# Patient Record
Sex: FEMALE | Race: WHITE | NOT HISPANIC OR LATINO | Employment: FULL TIME | URBAN - METROPOLITAN AREA
[De-identification: names, ages, dates, MRNs, and addresses within clinical notes are randomized per-mention and may not be internally consistent; named-entity substitution may affect disease eponyms.]

---

## 2017-12-14 ENCOUNTER — ALLSCRIPTS OFFICE VISIT (OUTPATIENT)
Dept: OTHER | Facility: OTHER | Age: 37
End: 2017-12-14

## 2017-12-14 ENCOUNTER — TRANSCRIBE ORDERS (OUTPATIENT)
Dept: ADMINISTRATIVE | Facility: HOSPITAL | Age: 37
End: 2017-12-14

## 2017-12-14 DIAGNOSIS — R93.0 FAMILIAL ENLARGEMENT OF THE SELLA TURCICA: Primary | ICD-10-CM

## 2017-12-14 DIAGNOSIS — R93.0 ABNORMAL FINDINGS ON DIAGNOSTIC IMAGING OF SKULL AND HEAD, NOT ELSEWHERE CLASSIFIED (CODE): ICD-10-CM

## 2017-12-16 NOTE — CONSULTS
Assessment  1  Abnormal MRI of head (793 0) (R93 0)   2  Visual field defects (368 40) (H53 40)   3  Essential hypertension (401 9) (I10)    Plan  Abnormal MRI of head    · (1) ANTICARDIOLIPIN ANTIBODY; Status:Active; Requested for:68Nka1286;    Perform:[facility]; Due:59Zbq7427; Ordered; For:Abnormal MRI of head; Ordered By:Salome Rodas;   · (1) BUN; Status:Active; Requested for:14Dec2017;    Perform:[facility]; Due:55Hkr4211; Ordered; For:Abnormal MRI of head; Ordered By:Homero Rodas;   · (1) CREATININE; Status:Active; Requested for:14Dec2017;    Perform:[facility]; Due:14Dec2018; Ordered; For:Abnormal MRI of head; Ordered By:Homero Rodas;   · (1) FACTOR V LEIDEN MUTATION DNA ANALYSIS; Status:Active; Requestedfor:14Dec2017;    Perform:[facility]; Due:14Dec2018; Ordered; For:Abnormal MRI of head; Ordered By:Homero Rodas;   · (1) LUPUS ANTICOAGULANT PROFILE; Status:Active; Requested for:14Dec2017;    Perform:[facility]; Due:14Dec2018; Ordered; For:Abnormal MRI of head; Ordered By:Homero Rodas;   · (1) LYME ANTIBODY PROFILE W/REFLEX TO WESTERN BLOT; Status:Active; Requestedfor:14Dec2017;    Perform:[facility]; Due:14Dec2018; Ordered; For:Abnormal MRI of head; Ordered By:Homero Rodas;   · (1) SED RATE; Status:Active; Requested for:14Dec2017;    Perform:[facility]; Due:50Pge8261; Ordered; For:Abnormal MRI of head; Ordered By:Homero Rodas;   · (1) Samul [place] ANTIBODIES; Status:Active; Requested for:14Dec2017;    Perform:[facility]; Due:76Uhu5143; Ordered; For:Abnormal MRI of head; Ordered By:Homero Rodas;   · [facility]) Protein S Panel; Status:Active; Requested for:51Bkx5880;    Perform:[facility]; Due:13Sgi4977; Ordered; For:Abnormal MRI of head; Ordered By:Homero Rodas;   · (Q) ANTITHROMBIN III PANEL; Status:Active; Requested for:14Dec2017;    Perform:[facility]; Due:70Bcz9230; Ordered; For:Abnormal MRI of head; Ordered By:Homero Rodas;   · (Q) PROTEIN C, ACTIVITY & ANTIGEN; Status:Active; Requested for:55Xbe8776;    Perform:[facility]; Due:39Bxc3300; Ordered; For:Abnormal MRI of head; Ordered By:Tyler Rodas;   · * MRI CERVICAL SPINE [address]; Status:Need Information - FinancialAuthorization; Requested for:[id number];    Perform:[facility]; Due:08Egq3266; Last Updated By:Madelaine Mitchell; 12/14/2017 3:41:02 PM;Ordered; For:Abnormal MRI of head; Ordered By:Tyler Rodas;   · * MRI THORACIC SPINE [address]; Status:Need Information - FinancialAuthorization; Requested for:[id number];    Perform:[facility]; Due:90Twe8736; Last Updated By:Madelaine Mitchell; 12/14/2017 3:41:02 PM;Ordered; For:Abnormal MRI of head; Ordered By:Tyler Rodas;   · *(Q) VITAMIN D, 25-HYDROXY, LC/MS/MS; Status:Active; Requested for:01Lji4538;    Perform:[facility]; Due:20Pde0567; Ordered; For:Abnormal MRI of head; Ordered By:Tyler Rodas;  Essential hypertension    · 1 - Ella Spear (Nephrology) Co-Management  *  Status: Active  Requested for:15Kyp9028   Ordered; For: Essential hypertension; Ordered By: Raman Smith Performed:  Due: 24PGK6045; Last Updated By: Delroy Pollack; 12/14/2017 3:41:02 PM  Care Summary provided  : Yes    Discussion/Summary  Discussion Summary:   Pt here for intial neuro assessmentpt with onset of right visual field disturbance in rub[place] novpt noted needing to move her head to get full picture on the right sidept denies diplopia , vertigo, ha , n or vpt with h/o sinus infectionspt notes no falls or tripsno change in bowel or bladderpt with known htn, initially diagnosed in her 20spt recommended to see nephrology for any causesalso rev mri head in detail with ptthere are multiple supra and infra tentorial lesions with varying degrees of enh and others non enhpt likely symptomatic from occipital lesion contributing to field abnon direct finger counting in office pt perfectpt is going for formal vf tomorrowpt was seen by optometry at onset    will obtain those recordspt needs mri c and t spine with and without cotnrast looking for any other areas of wm changespt denies any fever or chills or illness at time of presentationrev differential of ms, infectious and neoplasticless likely neoplastic and infectious given current clinical presentationwill check labs and hypercoag statuspt exam normalwould not give iv steroids at this time as pt has clinically improveddiscussed possiblity of LPwill get imaging of spine first as pt not overally excited about LPpt to call me about 5 days after labs and 3 days after mrisrtn in 6 weeks  Counseling Documentation With Imm: The patient was counseled regarding diagnostic results,-- instructions for management,-- risk factor reductions,-- prognosis,-- patient and family education,-- risks and benefits of treatment options  total time of encounter was 70 minutes-- and-- greater than 50% minutes was spent counseling  Chief Complaint  Chief Complaint Free Text Note Form: Patient is here for initial consult  History of Present Illness  HPI: pt is a 39 yo f with pmh of htn for many yrs  pt tried on mutliple meds  pt with issues with bp since her 25s  pt has not been seen by renal in the past  pt pt with onset of right visual field disturbance in rubin nov  pt noted needing to move her head to get full picture on the right side and to see her colleague on the right side was not as clear  pt recalls having to move her actual head to the right to see him better  pt denies diplopia , vertigo, ha , n or v  pt with h/o sinus infections and occ ha associated with her sinuses  nothing new or particular with the visual change  no fever or chills  no one else ill in the home  no change in speech or swallowing  no change in bowel or bladder  no loc  pt notes no falls or trips  pt was seen by optometry iniitally dr Mal Guerin [phone]  we do not have records at this time  per pt he thought she could not see as well to the right   pt also seen by her pcp  also rev mri head in detail with pt and spouse  there are multiple supra and infra tentorial lesions with varying degrees of enh and others non enh  lesions noted in the left cerebellar hemisphre measuring 9 mm with rim enh  multiple supratentorial lesions and periventricular in location  some of the pv lesions have a perpendicular orientation to the body of the LV  the largest is in the right temproal parietal area 11 mm with partial enh of the lesion  there is a 5mm enh lesion within the occipal horn of the left LV  these findings most likely represent MS  the supratentorial lesions are noted to have some of which demonstrate enh  MS considered most likely with other etiologies less likley  rev inflammatory, infectious and neoplastic with pt as well  pt had lyme testing done and told neg  no oct testing and no cord testing  rev the most likely dx of MS  rev course and pathophysiology of the disease and radiographical and clinical characteristics ie lesions  by time and space  pt likely symptomatic from occipital lesion contributing to field abn  on direct finger counting in office pt perfect  pt is going for formal vf tomorrow with dr Fritzi Buerger  pt had cancelled appt today with him since she was feeling well, we re ordered durng her appt and was able to get the 730 am appt back on schedule for her  pt was seen by optometry at onset    will obtain those records  no prior sxs  no prior unexplained neuro sxs  no utis  pt needs mri c and t spine with and without cotnrast looking for any other areas of wm changes  pt denies any fever or chills or illness at time of presentation  not clinically ill today  rev differential of ms, infectious and neoplasticless likely neoplastic and infectious given current clinical presentation   will check labs and hypercoag statuspt exam normal  at this time of about 6 weeks out from presentation, I would not give iv steroids at this time as pt has clinically improved  discussed possiblity of LP to help exclude infectious  will get imaging of spine first as pt not overally excited about LP  pt is not eager to do an lp and would like other testing first  no change in mental status  pt to call me about 5 days after labs and 3 days after mris  rtn in 6 weeks to discuss likely start of imd med  Review of Systems  Neurological ROS:  Constitutional: no fever, no chills, no recent weight gain, no recent weight loss, no complaints of feeling tired, no changes in appetite  HEENT:  no sinus problems, not feeling congested, no blurred vision, no dryness of the eyes, no eye pain, no hearing loss, no tinnitus, no mouth sores, no sore throat, no hoarseness, no dysphagia, no masses, no bleeding  Cardiovascular:  no chest pain or pressure, no palpitations present, the heart rate was not rapid or irregular, no swelling in the arms or legs, no poor circulation  Respiratory:  no unusual or persistant cough, no shortness of breath with or without exertion  Gastrointestinal:  no nausea, no vomiting, no diarrhea, no abdominal pain, no changes in bowel habits, no melena, no loss of bowel control  Genitourinary:  no incontinence, no feelings of urinary urgency, no increase in frequency, no urinary hesitancy, no dysuria, no hematuria  Musculoskeletal:  no arthralgias, no myalgias, no immobility or loss of function, no head/neck/back pain, no pain while walking  Integumentary  no masses, no rash, no skin lesions, no livedo reticularis  Psychiatric:  no anxiety, no depression, no mood swings, no psychiatric hospitalizations, no sleep problems  Endocrine  no unusual weight loss or gain, no excessive urination, no excessive thirst, no hair loss or gain, no hot or cold intolerance, no menstrual period change or irregularity, no loss of sexual ability or drive, no erection difficulty, no nipple discharge    Hematologic/Lymphatic:  no unusual bleeding, no tendency for easy bruising, no clotting skin or lumps  Neurological General:  no headache, no nausea or vomiting, no lightheadedness, no convulsions, no blackouts, no syncope, no trauma, no photopsia, no increased sleepiness, no trouble falling asleep, no snoring, no awakening at night  Neurological Mental Status:  no confusion, no mood swings, no alteration or loss of consciousness, no difficulty expressing/understanding speech, no memory problems  Neurological Cranial Nerves: blurry or double vision  Neurological Motor findings include:  no tremor, no twitching, no cramping(pre/post exercise), no atrophy  Neurological Coordination:  no unsteadiness, no vertigo or dizziness, no clumsiness, no problems reaching for objects  Neurological Sensory:  no numbness, no pain, no tingling, does not fall when eyes closed or taking a shower  Neurological Gait:  no difficulty walking, not falling to one side, no sensation of being pushed, has not had falls  Active Problems  1  Acute atopic conjunctivitis, unspecified laterality (372 05) (H10 10)   2  Acute conjunctivitis (372 00) (H10 30)   3  Acute pharyngitis (462) (J02 9)   4  Acute sinusitis (461 9) (J01 90)   5  Allergic rhinitis (477 9) (J30 9)   6  Cough (786 2) (R05)   7  Dermatitis (692 9) (L30 9)   8  Dysmenorrhea (625 3) (N94 6)   9  Encounter for routine gynecological examination (V72 31) (Z01 419)   10  Essential hypertension (401 9) (I10)   11  Morbid or severe obesity due to excess calories (278 01) (E66 01)    Past Medical History  1  History of Acute tonsillitis (463) (J03 90)   2  History of Encounter for routine gynecological examination (V72 31) (Z01 419)   3  History of Mastodynia (611 71) (N64 4)   4  History of Morbid or severe obesity due to excess calories (278 01) (E66 01)   5  History of Vitamin D deficiency (268 9) (E55 9)  Active Problems And Past Medical History Reviewed: The active problems and past medical history were reviewed and updated today  Surgical History  Surgical History Reviewed: The surgical history was reviewed and updated today  Family History  Mother    1  Family history of Family Health Status Of Mother - Good  Father    2  Family history of Asthma (V17 5)   3  Family history of Heart Disease (V17 49)  Family History Reviewed: The family history was reviewed and updated today  Social History   · Denied: History of Alcohol Use (History)   · Denied: History of Drug Use   · Never A Smoker   · Denied: History of Tobacco Use  Social History Reviewed: The social history was reviewed and updated today  Current Meds   1  Amoxicillin 875 MG Oral Tablet; TAKE 1 TABLET EVERY 12 HOURS DAILY; Therapy: 26NQS3272 to (Evaluate:19Jma6275)  Requested for: 01WZE5069; Last Rx:29Iie9787 Ordered   2  Amoxicillin-Pot Clavulanate 875-125 MG Oral Tablet; Take one tablet twice daily for 7 days; Therapy: 41CBU5823 to (Last Azzie Epp)  Requested for: 48TUY5300 Ordered   3  Mucinex 600 MG Oral Tablet Extended Release 12 Hour; as directed; Therapy: 18UDN1882 to (Last Rx:28Pxe4235) Ordered  Medication List Reviewed: The medication list was reviewed and updated today  Allergies  1  No Known Drug Allergies    Vitals  Signs   Recorded: 97Rff6164 02:21PM   Heart Rate: 99  Systolic: 079  Diastolic: 73  Height: 5 ft 4 in  Weight: 250 lb   BMI Calculated: 42 91  BSA Calculated: 2 15  O2 Saturation: 96    Physical Exam   Constitutional  General appearance: No acute distress, well appearing and well nourished  -- pos distal pulses abhijit  Eyes  Ophthalmoscopic examination: Vision is grossly normal  Gross visual field testing by confrontation shows no abnormalities  EOMI in both eyes  Conjunctivae clear  Eyelids normal palpebral fissures equal  Orbits exhibit normal position  No discharge from the eyes  PERRL  Musculoskeletal  Gait and station: Normal gait, stance and balance  Muscle strength: Normal strength throughout     Muscle tone: No atrophy, abnormal movements, flaccidity, cogwheeling or spasticity  Neurologic  Orientation to person, place, and time: Normal    Recent and remote memory: Demonstrates normal memory  Attention span and concentration: Normal thought process and attention span  Language: Names objects, able to repeat phrases and speaks spontaneously  Fund of knowledge: Normal vocabulary with appropriate knowledge of current events and past history  2nd cranial nerve: Normal  -- disc flat   3rd, 4th, and 6th cranial nerves: Normal    5th cranial nerve: Normal    7th cranial nerve: Normal    8th cranial nerve: Normal    9th cranial nerve: Normal    11th cranial nerve: Normal    12th cranial nerve: Normal    Sensation: Normal    Reflexes: Normal    Coordination: Normal        Results/Data  Diagnostic Studies Reviewed: I personally reviewed the films/images/results in the office today  My interpretation follows  Diagnostic Review see hpi  Future Appointments    Date/Time Provider Specialty Site   01/25/2018 10:00 AM EFE Eden   Neurology [facility]     Signatures   Electronically signed by : EFE Flaherty ; Dec 15 2017  6:18AM EST                       (Author)

## 2017-12-31 ENCOUNTER — HOSPITAL ENCOUNTER (OUTPATIENT)
Dept: RADIOLOGY | Facility: HOSPITAL | Age: 37
Discharge: HOME | End: 2017-12-31
Attending: PSYCHIATRY & NEUROLOGY
Payer: COMMERCIAL

## 2017-12-31 DIAGNOSIS — R93.0 ABNORMAL FINDINGS ON DIAGNOSTIC IMAGING OF SKULL AND HEAD, NOT ELSEWHERE CLASSIFIED (CODE): ICD-10-CM

## 2017-12-31 PROCEDURE — 72156 MRI NECK SPINE W/O & W/DYE: CPT

## 2017-12-31 PROCEDURE — 72157 MRI CHEST SPINE W/O & W/DYE: CPT

## 2017-12-31 PROCEDURE — A9585 GADOBUTROL INJECTION: HCPCS | Performed by: PSYCHIATRY & NEUROLOGY

## 2017-12-31 RX ADMIN — GADOBUTROL 15 ML: 604.72 INJECTION INTRAVENOUS at 14:48

## 2018-01-17 NOTE — PROGRESS NOTES
Assessment    1  Acute sinusitis (461 9) (J01 90)    Plan  Acute sinusitis    · Amoxicillin-Pot Clavulanate 875-125 MG Oral Tablet; Take one tablet twice daily for  7 days   · Mucinex 600 MG Oral Tablet Extended Release 12 Hour; as directed   · Apply warm moist compresses to the affected area 3 times a day for 5 minutes ;  Status:Complete;   Done: 80NCR0496 02:35PM   · Drink at least 6 glasses of water or juice a day ; Status:Complete;   Done: 72SHU0337  02:35PM   · Irrigate your nose twice a day ; Status:Complete;   Done: 14FLU2041 02:35PM   · Taking a hot steamy shower may help your condition ; Status:Complete;   Done:  83UUO8624 02:35PM    Discussion/Summary  Possible side effects of new medications were reviewed with the patient/guardian today  The treatment plan was reviewed with the patient/guardian  The patient/guardian understands and agrees with the treatment plan     Follow Up with your Primary Care Provider in 4-5 days  If your symptoms worsen follow up at the nearest [place] [address] Emergency Room  Chief Complaint    1  Cold Symptoms    History of Present Illness    Lowell Fairchild presents with complaints of gradual onset of constant episodes of moderate cold symptoms  Episodes started about 6 days ago  Associated symptoms include nasal congestion, runny nose, post nasal drainage, sore throat, productive cough, facial pressure, headache, fatigue, weakness and chills, but no fever  Review of Systems    Constitutional: as noted in HPI    ENT: as noted in HPI  Cardiovascular: no complaints of slow or fast heart rate, no chest pain, no palpitations, no leg claudication or lower extremity edema  Respiratory: no complaints of shortness of breath, no wheezing, no dyspnea on exertion, no orthopnea or PND  Active Problems    1  Acute atopic conjunctivitis, unspecified laterality (372 05) (H10 10)   2  Acute conjunctivitis (372 00) (H10 30)   3  Acute pharyngitis (462) (J02 9)   4   Acute sinusitis (461 9) (J01 90)   5  Allergic rhinitis (477 9) (J30 9)   6  Cough (786 2) (R05)   7  Dermatitis (692 9) (L30 9)   8  Dysmenorrhea (625 3) (N94 6)   9  Encounter for routine gynecological examination (V72 31) (Z01 419)   10  Essential hypertension (401 9) (I10)   11  Morbid or severe obesity due to excess calories (278 01) (E66 01)    Past Medical History    1  History of Acute tonsillitis (463) (J03 90)   2  History of Encounter for routine gynecological examination (V72 31) (Z01 419)   3  History of Mastodynia (611 71) (N64 4)   4  History of Morbid or severe obesity due to excess calories (278 01) (E66 01)   5  History of Vitamin D deficiency (268 9) (E55 9)    The active problems and past medical history were reviewed and updated today  Family History  Mother    1  Family history of Family Health Status Of Mother - Good  Father    2  Family history of Asthma (V17 5)   3  Family history of Heart Disease (V17 49)    The family history was reviewed and updated today  Social History    · Denied: History of Alcohol Use (History)   · Denied: History of Drug Use   · Never A Smoker   · Denied: History of Tobacco Use  The social history was reviewed and updated today  Current Meds   1  Amoxicillin 875 MG Oral Tablet; TAKE 1 TABLET EVERY 12 HOURS DAILY; Therapy: 79LPR3851 to (Evaluate:71Don8432)  Requested for: 48HCI8878; Last   Rx:74Ibl8800 Ordered    The medication list was reviewed and updated today  Allergies    1  No Known Drug Allergies    Observations Made  Mildly ill appearing, appears fatigues  Nasal sounding voice  PTP of the maxillary sinuses BL      Message    Timoteo Mchugh is under my professional care   She was evaluated by myself via video conference on 9/25/16             Signatures   Electronically signed by : Sheryll Canavan, D O ; Sep 25 2016  2:35PM EST                       (Author)

## 2018-01-18 NOTE — MISCELLANEOUS
Message  Return to work or school evisit:   Dheeraj Mitchell is under my professional care   She was evaluated by myself via video conference on 9/25/16             Signatures   Electronically signed by : ORAL Jason ; Sep 25 2016  2:35PM EST                       (Author)

## 2018-01-23 VITALS
BODY MASS INDEX: 42.68 KG/M2 | HEIGHT: 64 IN | WEIGHT: 250 LBS | OXYGEN SATURATION: 96 % | SYSTOLIC BLOOD PRESSURE: 151 MMHG | HEART RATE: 99 BPM | DIASTOLIC BLOOD PRESSURE: 73 MMHG

## 2018-01-25 ENCOUNTER — OFFICE VISIT (OUTPATIENT)
Dept: NEUROLOGY | Facility: CLINIC | Age: 38
End: 2018-01-25
Payer: COMMERCIAL

## 2018-01-25 VITALS
BODY MASS INDEX: 50.02 KG/M2 | HEIGHT: 64 IN | RESPIRATION RATE: 16 BRPM | SYSTOLIC BLOOD PRESSURE: 162 MMHG | WEIGHT: 293 LBS | HEART RATE: 86 BPM | DIASTOLIC BLOOD PRESSURE: 76 MMHG

## 2018-01-25 DIAGNOSIS — R93.0 ABNORMAL MRI OF HEAD: Primary | ICD-10-CM

## 2018-01-25 DIAGNOSIS — H53.40 VISUAL FIELD DEFECTS: ICD-10-CM

## 2018-01-25 PROCEDURE — 99215 OFFICE O/P EST HI 40 MIN: CPT | Performed by: PSYCHIATRY & NEUROLOGY

## 2018-01-25 RX ORDER — FLUTICASONE PROPIONATE 50 MCG
SPRAY, SUSPENSION (ML) NASAL DAILY PRN
Refills: 0 | COMMUNITY
Start: 2017-10-25 | End: 2020-02-20

## 2018-01-25 RX ORDER — NAPROXEN SODIUM 550 MG/1
550 TABLET ORAL
COMMUNITY
Start: 2017-09-07 | End: 2021-06-29

## 2018-01-25 RX ORDER — SERTRALINE HYDROCHLORIDE 25 MG/1
TABLET, FILM COATED ORAL
Refills: 11 | COMMUNITY
Start: 2017-12-22 | End: 2019-01-25

## 2018-01-25 RX ORDER — CETIRIZINE HYDROCHLORIDE 10 MG/1
TABLET ORAL
COMMUNITY
Start: 2017-08-22 | End: 2019-10-09 | Stop reason: ALTCHOICE

## 2018-01-25 RX ORDER — AMLODIPINE BESYLATE 10 MG/1
TABLET ORAL
Refills: 5 | COMMUNITY
Start: 2018-01-07 | End: 2018-06-26

## 2018-01-25 RX ORDER — HYDROCHLOROTHIAZIDE 25 MG/1
TABLET ORAL
Refills: 11 | COMMUNITY
Start: 2018-01-07 | End: 2018-02-20 | Stop reason: SDUPTHER

## 2018-01-25 NOTE — PATIENT INSTRUCTIONS
Visual field defects  Pt here for neuro followup  Pt last seen in dec   Pt had updated mri c and t spine since our last visit looking for any other wm abn  Mri c and t spine rev at [facility] and no intramedullary lesions seen, no enh  Pt had updated labs as well  Neg cardiolipin ab, lyme neg in serum, sjogrens neg, factor v not detected  Pt did have elevated sed rate of 58 in dec and then repeated in [place] with level of 55  Pt had a fall just prior to the dec lab and also injection in her right wrist about 6 days prior to the jan draw  No fever or chills , no recent infections  Re rev abn mri head findings again with pt and spouse  Due to varying degrees of enh and old lesions both supra and infratentorially, highly suspicious of MS  Will proceed with LP due to presentation, elevated sed rate and multiplicity of lesions and enh  Pt is in agreement , rev procedure and risk for infection and post spinal ha  Staff will help with orders and coordination with IR   Rev with pt multiple IMD therapies as well as pros and cons of each  Will provide literature as well  Pt also needs jcv ab testing to be done at [facility]    Abnormal MRI of head  Pt had rest of cns work up completed with mri c and t spine as noted above  Negative mri c and t spine for any cord signal abn  Pt to retn after LP within about 2 weeks to review results and likely start med program  Pt to also call for any new sxs  Pt did follow up with dr Hasmukh Kimbrough, still need consultation note  Office will call for report  Per pt, pt back to baseline

## 2018-01-25 NOTE — ASSESSMENT & PLAN NOTE
Pt here for neuro followup  Pt last seen in dec   Pt had updated mri c and t spine since our last visit looking for any other wm abn  Mri c and t spine rev at appt and no intramedullary lesions seen, no enh  Pt had updated labs as well  Neg cardiolipin ab, lyme neg in serum, sjogrens neg, factor v not detected  Pt did have elevated sed rate of 58 in dec and then repeated in [place] with level of 55  Pt had a fall just prior to the dec lab and also injection in her right wrist about 6 days prior to the jan draw  No fever or chills , no recent infections  Re rev abn mri head findings again with pt and spouse  Due to varying degrees of enh and old lesions both supra and infratentorially, highly suspicious of MS  Will proceed with LP due to presentation, elevated sed rate and multiplicity of lesions and enh  Pt is in agreement , rev procedure and risk for infection and post spinal ha  Staff will help with orders and coordination with IR   Rev with pt multiple IMD therapies as well as pros and cons of each  Will provide literature as well  Pt also needs jcv ab testing to be done at [facility]

## 2018-01-25 NOTE — PROGRESS NOTES
Patient ID: Lamin Bunch is a 40 y o  female  Assessment/Plan:    Visual field defects  Pt here for neuro followup  Pt last seen in dec   Pt had updated mri c and t spine since our last visit looking for any other wm abn  Mri c and t spine rev at [facility] and no intramedullary lesions seen, no enh  Pt had updated labs as well  Neg cardiolipin ab, lyme neg in serum, sjogrens neg, factor v not detected  Pt did have elevated sed rate of 58 in dec and then repeated in [place] with level of 55  Pt had a fall just prior to the dec lab and also injection in her right wrist about 6 days prior to the jan draw  No fever or chills , no recent infections  Re rev abn mri head findings again with pt and spouse  Due to varying degrees of enh and old lesions both supra and infratentorially, highly suspicious of MS  Will proceed with LP due to presentation, elevated sed rate and multiplicity of lesions and enh  Pt is in agreement , rev procedure and risk for infection and post spinal ha  Staff will help with orders and coordination with IR   Rev with pt multiple IMD therapies as well as pros and cons of each  Will provide literature as well  Pt also needs jcv ab testing to be done at [facility]    Abnormal MRI of head  Pt had rest of cns work up completed with mri c and t spine as noted above  Negative mri c and t spine for any cord signal abn  Pt to retn after LP within about 2 weeks to review results and likely start med program  Pt to also call for any new sxs  Pt did follow up with dr Tracy Tsai, still need consultation note  Office will call for report  Per pt, pt back to baseline              Subjective:    HPI  HPI: pt is a 39 yo f with pmh of htn for many yrs  pt tried on mutliple meds  pt with issues with bp since her 25s   pt has not been seen by renal in the past  pt pt with onset of right visual field disturbance in [place] nov  pt noted needing to move her head to get full picture on the right side and to see her colleague on the right side was not as clear  pt recalls having to move her actual head to the right to see him better  pt denies diplopia , vertigo, ha , n or v  pt with h/o sinus infections and occ ha associated with her sinuses  nothing new or particular with the visual change  no fever or chills  no one else ill in the home  no change in speech or swallowing  no change in bowel or bladder  no loc  pt notes no falls or trips  pt was seen by optometry iniitally dr Yasmine Mattson [phone]  we were able to obtain optometry record and question of a left MR paralysis  Pt was referred to dr Johanna Stanton  pt also seen by her pcp  also  Re rev mri head in detail with pt and spouse again today at appt  there are multiple supra and infra tentorial lesions with varying degrees of enh and others non enh  lesions noted in the left cerebellar hemisphre measuring 9 mm with rim enh  multiple supratentorial lesions and periventricular in location  some of the pv lesions have a perpendicular orientation to the body of the LV  the largest is in the right temproal parietal area 11 mm with partial enh of the lesion  there is a 5mm enh lesion within the occipal horn of the left LV  Re rev wit hpt ant spouse that these findings most likely represent MS  the supratentorial lesions are noted to have some of which demonstrate enh  MS considered most likely with other etiologies less likley  rev inflammatory, infectious and neoplastic with pt as well  pt had lyme testing done and told neg  This was repeated since our last visit and also neg  Pt seen by optho at our encouragement as we had to re make appt for her  No records sent  we will call again for report  Per pt no optho concerns and pt was back to her baseline  Pt had updated mri c and t spine since our last visit looking for any other wm abn  Mri c and t spine rev at appt and no intramedullary lesions seen, no enh  Pt had updated labs as well    Neg cardiolipin ab, lyme neg in serum, sjogrens neg, factor v not detected  Pt did have elevated sed rate of 58 in dec and then repeated in [place] with level of 55  Pt had a fall just prior to the dec lab and also injection in her right wrist about 6 days prior to the jan draw  No fever or chills , no recent infections  Re rev abn mri head findings again with pt and spouse  Due to varying degrees of enh and old lesions both supra and infratentorially, highly suspicious of Ms  Will proceed with LP due to presentation, elevated sed rate and multiplicity of lesions and enh  Pt is in agreement , rev procedure and risk for infection and post spinal ha  Staff will help with orders and coordination with IR   Rev with pt multiple IMD therapies as well as pros and cons of each  Rev all meds and side effect profile  Also provided pt with copy of literature for about 6 of the medications     Pt also needs jcv ab testing to be done at [facility]   This testing will also help narrow down our options as well  Extended appt today due to review of presentation to current as well as all testing to date and rev of differential of all possible etiologies  rev the most likely dx of MS  rev course and pathophysiology of the disease and radiographical and clinical characteristics ie lesions  by time and space  pt likely symptomatic from occipital lesion contributing to field abn  on direct finger counting in office pt perfect  Need copyy of formal vf from dr Rose Pool  no prior unexplained neuro sxs  no utis  No change in speech or swallowing  No falls or trips  No recent infections  No sz  No head trauma  RESULTS      MRI THORACIC SPINE 12/31/17  1  No disc  2  No cord signal abnormality, abnormal enhancement or MR findings confirm demyelinating disease in the visualized spinal cord      MRI C SPINE 12/31/17   1  Minimal spondylosis  No cord compression or cord signal abnormality    2   No MR findings of demyelinating     No results found for this or any previous visit (from the past 2016 hour(s))  The following portions of the patient's history were reviewed and updated as appropriate: allergies, current medications, past family history, past medical history, past social history, past surgical history and problem list        Objective:    Blood pressure 162/76, pulse 86, resp  rate 16, height 5' 4" (1 626 m), weight (!) 155 kg (342 lb)  Physical Exam   Constitutional: She appears well-developed  Eyes: EOM are normal  Pupils are equal, round, and reactive to light  Neck: Normal range of motion  Cardiovascular: Intact distal pulses  Neurological: She has normal strength and normal reflexes  Gait normal    Psychiatric: Her speech is normal        Neurological Exam    Mental Status  The patient is alert and oriented to person, place, time, and situation  Her recent and remote memory are normal  Her speech is normal  Her language is fluent with no aphasia  She has normal attention span and concentration  She follows multi-step commands  She has a normal fund of knowledge  Cranial Nerves    CN II: The patient's visual acuity and visual fields are normal  Funduscopic exam performed  Right: The right disc is normal   Left: The left disc is normal   CN III, IV, VI: The patient's pupils are equally round and reactive to light and ocular movements are normal   CN V: The patient has normal facial sensation  CN VII:  The patient has symmetric facial movement  CN VIII:  The patient's hearing is normal   CN IX, X: The patient has symmetric palate movement and normal gag reflex  CN XI: The patient's shoulder shrug strength is normal   CN XII: The patient's tongue is midline without atrophy or fasciculations  Motor  The patient has normal muscle bulk throughout  Her overall muscle tone is normal throughout  Her strength is 5/5 throughout all four extremities        Sensory  The patient's sensation is normal in all four extremities to light touch, pinprick, temperature, vibration and proprioception  Reflexes  Deep tendon reflexes are 2+ and symmetric in all four extremities with downgoing toes bilaterally  Gait and Coordination  The patient has normal gait and station  She has normal right heel to shin and normal left heel to shin coordination  She has normal right finger to nose and normal left finger to nose coordination  ROS:    Review of Systems   Constitutional: Negative  Negative for appetite change and fever  HENT: Negative  Negative for hearing loss, tinnitus, trouble swallowing and voice change  Eyes: Negative  Negative for photophobia and pain  Respiratory: Negative  Negative for shortness of breath  Cardiovascular: Negative  Negative for palpitations  Gastrointestinal: Negative  Negative for nausea and vomiting  Endocrine: Negative  Negative for cold intolerance and heat intolerance  Genitourinary: Negative  Negative for dysuria, frequency and urgency  Musculoskeletal: Negative  Negative for myalgias and neck pain  Right wrist pain   Skin: Negative  Negative for rash  Neurological: Negative  Negative for dizziness, tremors, seizures, syncope, facial asymmetry, speech difficulty, weakness, light-headedness, numbness and headaches  Hematological: Bruises/bleeds easily  Psychiatric/Behavioral: Positive for sleep disturbance  Negative for confusion and hallucinations          Increased sleepiness

## 2018-01-25 NOTE — ASSESSMENT & PLAN NOTE
Pt had rest of cns work up completed with mri c and t spine as noted above  Negative mri c and t spine for any cord signal abn  Pt to retn after LP within about 2 weeks to review results and likely start med program  Pt to also call for any new sxs  Pt did follow up with dr Robert Bach, still need consultation note  Office will call for report  Per pt, pt back to baseline

## 2018-01-29 DIAGNOSIS — R93.0 ABNORMAL MRI OF HEAD: Primary | ICD-10-CM

## 2018-01-29 NOTE — PROGRESS NOTES
Orders for LP to be sent along with paper copy to Michelle Ch from IR at [place] for upcoming lp on feb 9th

## 2018-01-30 DIAGNOSIS — R93.0 ABNORMAL MRI OF HEAD: Primary | ICD-10-CM

## 2018-02-09 ENCOUNTER — HOSPITAL ENCOUNTER (OUTPATIENT)
Dept: RADIOLOGY | Facility: HOSPITAL | Age: 38
Discharge: HOME | End: 2018-02-09
Attending: PSYCHIATRY & NEUROLOGY | Admitting: RADIOLOGY
Payer: COMMERCIAL

## 2018-02-09 VITALS
SYSTOLIC BLOOD PRESSURE: 146 MMHG | TEMPERATURE: 100 F | OXYGEN SATURATION: 100 % | RESPIRATION RATE: 18 BRPM | DIASTOLIC BLOOD PRESSURE: 74 MMHG | HEART RATE: 80 BPM | BODY MASS INDEX: 50.02 KG/M2 | HEIGHT: 64 IN | WEIGHT: 293 LBS

## 2018-02-09 DIAGNOSIS — R93.0 ABNORMAL MRI OF HEAD: ICD-10-CM

## 2018-02-09 LAB
APPEARANCE CSF: CLEAR
GLUCOSE CSF-MCNC: 52 MG/DL (ref 50–80)
GRAM STN SPEC: NORMAL
GRAM STN SPEC: NORMAL
INR PPP: 1 (ref 0.86–1.16)
LYMPHOCYTES NFR CSF MANUAL: 47 %
MONOS+MACROS CSF MANUAL: 53 %
PLATELET # BLD AUTO: 369 THOUSANDS/UL (ref 149–390)
PMV BLD AUTO: 10.7 FL (ref 8.9–12.7)
PROT CSF-MCNC: 31 MG/DL (ref 15–45)
PROTHROMBIN TIME: 13.2 SECONDS (ref 12.1–14.4)
RBC # CSF MANUAL: 1 UL (ref 0–10)
TOTAL CELLS COUNTED BLD: NO
TOTAL CELLS COUNTED SPEC: 32
TUBE # CSF: 4
WBC # CSF AUTO: 2 /UL (ref 0–5)

## 2018-02-09 PROCEDURE — 88108 CYTOPATH CONCENTRATE TECH: CPT | Performed by: PSYCHIATRY & NEUROLOGY

## 2018-02-09 PROCEDURE — 87496 CYTOMEG DNA AMP PROBE: CPT

## 2018-02-09 PROCEDURE — 87476 LYME DIS DNA AMP PROBE: CPT | Performed by: PSYCHIATRY & NEUROLOGY

## 2018-02-09 PROCEDURE — 88184 FLOWCYTOMETRY/ TC 1 MARKER: CPT | Performed by: PSYCHIATRY & NEUROLOGY

## 2018-02-09 PROCEDURE — 82164 ANGIOTENSIN I ENZYME TEST: CPT | Performed by: PSYCHIATRY & NEUROLOGY

## 2018-02-09 PROCEDURE — 87102 FUNGUS ISOLATION CULTURE: CPT | Performed by: PSYCHIATRY & NEUROLOGY

## 2018-02-09 PROCEDURE — 83873 ASSAY OF CSF PROTEIN: CPT | Performed by: PSYCHIATRY & NEUROLOGY

## 2018-02-09 PROCEDURE — 82784 ASSAY IGA/IGD/IGG/IGM EACH: CPT | Performed by: PSYCHIATRY & NEUROLOGY

## 2018-02-09 PROCEDURE — 87529 HSV DNA AMP PROBE: CPT

## 2018-02-09 PROCEDURE — 87653 STREP B DNA AMP PROBE: CPT

## 2018-02-09 PROCEDURE — 88185 FLOWCYTOMETRY/TC ADD-ON: CPT

## 2018-02-09 PROCEDURE — 87802 STREP B ASSAY W/OPTIC: CPT | Performed by: PSYCHIATRY & NEUROLOGY

## 2018-02-09 PROCEDURE — 87070 CULTURE OTHR SPECIMN AEROBIC: CPT

## 2018-02-09 PROCEDURE — 87532 HHV-6 DNA AMP PROBE: CPT

## 2018-02-09 PROCEDURE — 89051 BODY FLUID CELL COUNT: CPT

## 2018-02-09 PROCEDURE — 86255 FLUORESCENT ANTIBODY SCREEN: CPT | Performed by: PSYCHIATRY & NEUROLOGY

## 2018-02-09 PROCEDURE — 84157 ASSAY OF PROTEIN OTHER: CPT

## 2018-02-09 PROCEDURE — 87899 AGENT NOS ASSAY W/OPTIC: CPT | Performed by: PSYCHIATRY & NEUROLOGY

## 2018-02-09 PROCEDURE — 87498 ENTEROVIRUS PROBE&REVRS TRNS: CPT

## 2018-02-09 PROCEDURE — 36415 COLL VENOUS BLD VENIPUNCTURE: CPT | Performed by: PSYCHIATRY & NEUROLOGY

## 2018-02-09 PROCEDURE — 82040 ASSAY OF SERUM ALBUMIN: CPT | Performed by: PSYCHIATRY & NEUROLOGY

## 2018-02-09 PROCEDURE — 87798 DETECT AGENT NOS DNA AMP: CPT | Performed by: PSYCHIATRY & NEUROLOGY

## 2018-02-09 PROCEDURE — 87798 DETECT AGENT NOS DNA AMP: CPT

## 2018-02-09 PROCEDURE — 85049 AUTOMATED PLATELET COUNT: CPT | Performed by: RADIOLOGY

## 2018-02-09 PROCEDURE — 87252 VIRUS INOCULATION TISSUE: CPT | Performed by: PSYCHIATRY & NEUROLOGY

## 2018-02-09 PROCEDURE — 62270 DX LMBR SPI PNXR: CPT

## 2018-02-09 PROCEDURE — 82042 OTHER SOURCE ALBUMIN QUAN EA: CPT | Performed by: PSYCHIATRY & NEUROLOGY

## 2018-02-09 PROCEDURE — 82945 GLUCOSE OTHER FLUID: CPT

## 2018-02-09 PROCEDURE — 83916 OLIGOCLONAL BANDS: CPT | Performed by: PSYCHIATRY & NEUROLOGY

## 2018-02-09 PROCEDURE — 85610 PROTHROMBIN TIME: CPT | Performed by: RADIOLOGY

## 2018-02-09 PROCEDURE — 89050 BODY FLUID CELL COUNT: CPT

## 2018-02-09 RX ORDER — LIDOCAINE HYDROCHLORIDE 10 MG/ML
20 INJECTION, SOLUTION INFILTRATION; PERINEURAL
Status: COMPLETED | OUTPATIENT
Start: 2018-02-09 | End: 2018-02-09

## 2018-02-09 RX ADMIN — LIDOCAINE HYDROCHLORIDE 2 ML: 10 INJECTION, SOLUTION INFILTRATION; PERINEURAL at 15:33

## 2018-02-09 NOTE — DISCHARGE INSTRUCTIONS
Lumbar Puncture   WHAT YOU NEED TO KNOW:   Lumbar puncture (LP) is a procedure in which a needle is inserted in your back and into your spinal canal  This is usually done to collect cerebrospinal fluid (CSF) to check for an infection, inflammation, bleeding, or other conditions that affect the brain  CSF is a clear, protective fluid that flows around the brain and inside the spinal canal  LP may also be done to remove CSF to reduce pressure in the brain  DISCHARGE INSTRUCTIONS:   Medicines:   · Acetaminophen: This medicine decreases pain and lowers a fever  It is available without a doctor's order  Ask how much to take and how often to take it  Follow directions  Acetaminophen can cause liver damage  · NSAIDs:  These medicines decrease swelling, pain, and fever  NSAIDs are available without a doctor's order  Ask your healthcare provider which medicine is right for you and how much to take  Take as directed  NSAIDs can cause stomach bleeding or kidney problems if not taken correctly  · Pain medicine: You may be given a prescription medicine to decrease severe pain  Do not wait until the pain is severe before you take more pain medicine  · Take your medicine as directed  Contact your healthcare provider if you think your medicine is not helping or if you have side effects  Tell him or her if you are allergic to any medicine  Keep a list of the medicines, vitamins, and herbs you take  Include the amounts, and when and why you take them  Bring the list or the pill bottles to follow-up visits  Carry your medicine list with you in case of an emergency  Follow up with your healthcare provider as directed:  Write down your questions so you remember to ask them during your visits  Post-lumbar puncture headache: You may develop a headache during the first few hours after your LP that may last for several days  The headache may be mild to severe and may get worse when you sit or stand   The following may help ease a post-lumbar puncture headache:  · Drink plenty of liquids: You should drink more liquid than usual after your LP  Ask how much liquid is right for you  Caffeine may be used to treat a headache  Drinks, such as coffee, tea, or some sodas, have caffeine  Caffeine is also available over the counter in tablet form  Ask about using caffeine to treat your headache  Do not drink alcohol  · Lie down: If you have a headache after your lumbar puncture, it may be helpful to lie down and rest   Contact your healthcare provider if:   · You have questions or concerns about your condition or care  Seek care immediately or call 911 if:   · You have a severe headache that does not get better after you lie down  · You have a fever  · You have a stiff neck or have trouble thinking clearly  · Your legs, feet, or other parts below the waist feel numb, tingly, or weak  · You have bleeding or a discharge coming from the area where the needle was put into your back  · You have severe pain in your back or neck  © 2017 [facility] Information is for End User's use only and may not be sold, redistributed or otherwise used for commercial purposes  All illustrations and images included in CareNotes® are the copyrighted property of A D A M , Inc  or [employer]  The above information is an  only  It is not intended as medical advice for individual conditions or treatments  Talk to your doctor, nurse or pharmacist before following any medical regimen to see if it is safe and effective for you

## 2018-02-10 LAB
C GATTII+NEOFOR DNA CSF QL NAA+NON-PROBE: NOT DETECTED
CMV DNA CSF QL NAA+NON-PROBE: NOT DETECTED
E COLI K1 DNA CSF QL NAA+NON-PROBE: NOT DETECTED
EV RNA CSF QL NAA+NON-PROBE: NOT DETECTED
GP B STREP DNA CSF QL NAA+NON-PROBE: NOT DETECTED
HAEM INFLU DNA CSF QL NAA+NON-PROBE: NOT DETECTED
HHV6 DNA CSF QL NAA+NON-PROBE: NOT DETECTED
HSV1 DNA CSF QL NAA+NON-PROBE: NOT DETECTED
HSV2 DNA CSF QL NAA+NON-PROBE: NOT DETECTED
L MONOCYTOG DNA CSF QL NAA+NON-PROBE: NOT DETECTED
N MEN DNA CSF QL NAA+NON-PROBE: NOT DETECTED
PARECHOVIRUS A RNA CSF QL NAA+NON-PROBE: NOT DETECTED
S PNEUM DNA CSF QL NAA+NON-PROBE: NOT DETECTED
VZV DNA CSF QL NAA+NON-PROBE: NOT DETECTED

## 2018-02-11 ENCOUNTER — OFFICE VISIT (OUTPATIENT)
Dept: URGENT CARE | Facility: MEDICAL CENTER | Age: 38
End: 2018-02-11
Payer: COMMERCIAL

## 2018-02-11 VITALS
RESPIRATION RATE: 18 BRPM | OXYGEN SATURATION: 97 % | SYSTOLIC BLOOD PRESSURE: 175 MMHG | WEIGHT: 293 LBS | BODY MASS INDEX: 50.02 KG/M2 | HEIGHT: 64 IN | TEMPERATURE: 98.7 F | DIASTOLIC BLOOD PRESSURE: 80 MMHG | HEART RATE: 90 BPM

## 2018-02-11 DIAGNOSIS — J06.9 ACUTE URI: Primary | ICD-10-CM

## 2018-02-11 PROCEDURE — S9083 URGENT CARE CENTER GLOBAL: HCPCS | Performed by: PHYSICIAN ASSISTANT

## 2018-02-11 PROCEDURE — G0381 LEV 2 HOSP TYPE B ED VISIT: HCPCS | Performed by: PHYSICIAN ASSISTANT

## 2018-02-11 PROCEDURE — 99282 EMERGENCY DEPT VISIT SF MDM: CPT | Performed by: PHYSICIAN ASSISTANT

## 2018-02-11 RX ORDER — BENZONATATE 200 MG/1
200 CAPSULE ORAL 3 TIMES DAILY PRN
Qty: 20 CAPSULE | Refills: 0 | Status: SHIPPED | OUTPATIENT
Start: 2018-02-11 | End: 2018-02-20

## 2018-02-11 NOTE — PROGRESS NOTES
Assessment/Plan:    No problem-specific Assessment & Plan notes found for this encounter  Diagnoses and all orders for this visit:    Acute URI  -     benzonatate (TESSALON) 200 MG capsule; Take 1 capsule (200 mg total) by mouth 3 (three) times a day as needed for cough      Increase fluids, continue Zicam and over-the-counter cold medicines  Follow-up if symptoms increase or no better in 5 days  Notes were reviewed and agreed with  Subjective:      Patient ID: Lamin Bunch is a 40 y o  female  39 y/o cauc female c/o congestion, rhinorrhea, and productive cough with green sputum that keeps her up at night x 6 days  Pt used OTC-"sinulitis "and zycam which helped relieve Sx temporarily but sx worsened after 2 days  Pt also c/o hoarse voice x 4 days and mild wheezing 3 nights ago which prompted her to use her albuterol inhaler  Denies fevers, chills, body aches, Cp, SOB, ear pain, N/V/D or abdominal pain  Pt has hx of asthma, and seasonal allergies  Pt had LP 2 days ago as of her workup for Ms  Cough   Associated symptoms include rhinorrhea and a sore throat  Pertinent negatives include no chest pain, chills, ear pain or fever  Her past medical history is significant for asthma  Patient denies any fever or chills  She has a prescription for Flonase but has not used it  She feels congested and has postnasal drip  She also has laryngitis  The following portions of the patient's history were reviewed and updated as appropriate: past family history, past social history, past surgical history and problem list     Review of Systems   Constitutional: Negative for chills and fever  HENT: Positive for nosebleeds, rhinorrhea, sore throat and voice change  Negative for ear pain, sinus pain and sneezing  Respiratory: Positive for cough  Cardiovascular: Negative for chest pain  Gastrointestinal: Negative for diarrhea           Objective:    Vitals:    02/11/18 1329   BP: (!) 175/80   Pulse: 90   Resp: 18   Temp: 98 7 °F (37 1 °C)   SpO2: 97%        Physical Exam   Constitutional: She is oriented to person, place, and time  She appears well-developed and well-nourished  HENT:   Head: Normocephalic and atraumatic  Right Ear: Tympanic membrane and external ear normal  Tympanic membrane is not erythematous and not bulging  Left Ear: Tympanic membrane and external ear normal  Tympanic membrane is not erythematous and not bulging  Nose: Mucosal edema present  Right sinus exhibits no maxillary sinus tenderness and no frontal sinus tenderness  Left sinus exhibits no maxillary sinus tenderness and no frontal sinus tenderness  Mouth/Throat: Uvula is midline  No trismus in the jaw  No uvula swelling  Posterior oropharyngeal edema and posterior oropharyngeal erythema present  No oropharyngeal exudate or tonsillar abscesses  Erythematous turbinates, rhinorrhea   Eyes: Right eye exhibits no discharge  Left eye exhibits no discharge  Neck: Neck supple  Cardiovascular: Normal rate, regular rhythm, S1 normal and S2 normal   Exam reveals no gallop and no friction rub  No murmur heard  Pulmonary/Chest: Effort normal and breath sounds normal  No respiratory distress  She has no wheezes  She has no rhonchi  She has no rales  Lymphadenopathy:        Head (right side): Tonsillar adenopathy present  Head (left side): Tonsillar adenopathy present  She has no cervical adenopathy  Neurological: She is alert and oriented to person, place, and time  Tms slightly bulging, nasal mucosa boggy  Left greater than right, swollen  Good transillumination

## 2018-02-11 NOTE — PATIENT INSTRUCTIONS
Cold Symptoms   WHAT YOU NEED TO KNOW:   A cold is an infection caused by a virus  The infection causes your upper respiratory system to become inflamed  Common symptoms of a cold include sneezing, dry throat, a stuffy nose, headache, watery eyes, and a cough  Your cough may be dry, or you may cough up mucus  You may also have muscle aches, joint pain, and tiredness  Rarely, you may have a fever  Most colds go away without treatment  DISCHARGE INSTRUCTIONS:   Return to the emergency department if:   · You have increased tiredness and weakness  · You are unable to eat  · Your heart is beating much faster than usual for you  · You see white spots in the back of your throat and your neck is swollen and sore to the touch  · You see pinpoint or larger reddish-purple dots on your skin  Contact your healthcare provider if:   · You have a fever higher than 102°F (38 9°C)  · You have new or worsening shortness of breath  · You have thick nasal drainage for more than 2 days  · Your symptoms do not improve or get worse within 5 days  · You have questions or concerns about your condition or care  Medicines: The following medicines may be suggested by your healthcare provider to decrease your cold symptoms  These medicines are available without a doctor's order  Ask which medicines to take and when to take them  Follow directions  · NSAIDs or acetaminophen  help to bring down a fever or decrease pain  · Decongestants  help decrease nasal stuffiness  · Antihistamines  help decrease sneezing and a runny nose  · Cough suppressants  help decrease how much you cough  · Expectorants  help loosen mucus so you can cough it up  · Take your medicine as directed  Contact your healthcare provider if you think your medicine is not helping or if you have side effects  Tell him of her if you are allergic to any medicine  Keep a list of the medicines, vitamins, and herbs you take   Include the amounts, and when and why you take them  Bring the list or the pill bottles to follow-up visits  Carry your medicine list with you in case of an emergency  Symptom relief: The following may help relieve cold symptoms, such as a dry throat and congestion:  · Gargle with mouthwash or warm salt water as directed  · Suck on throat lozenges or hard candy  · Use a cold or warm vaporizer or humidifier to ease your breathing  · Rest for at least 2 days and then as needed to decrease tiredness and weakness  · Use petroleum based jelly around your nostrils to decrease irritation from blowing your nose  Drink liquids:  Liquids will help thin and loosen thick mucus so you can cough it up  Liquids will also keep you hydrated  Ask your healthcare provider which liquids are best for you and how much to drink each day  Prevent the spread of germs: You can spread your cold germs to others for at least 3 days after your symptoms start  Wash your hands often  Do not share items, such as eating utensils  Cover your nose and mouth when you cough or sneeze using the crook of your elbow instead of your hands  Throw used tissues in the garbage  Do not smoke:  Smoking may worsen your symptoms and increase the length of time you feel sick  Talk with your healthcare provider if you need help to stop smoking  Follow up with your healthcare provider as directed:  Write down your questions so you remember to ask them during your visits  © 2017 [address] [employer] Information is for End User's use only and may not be sold, redistributed or otherwise used for commercial purposes  All illustrations and images included in CareNotes® are the copyrighted property of A D A M , Inc  or [employer]ick [employer]  The above information is an  only  It is not intended as medical advice for individual conditions or treatments   Talk to your doctor, nurse or pharmacist before following any medical regimen to see if it is safe and effective for you

## 2018-02-12 ENCOUNTER — TELEPHONE (OUTPATIENT)
Dept: NEUROLOGY | Facility: CLINIC | Age: 38
End: 2018-02-12

## 2018-02-12 LAB
BACTERIA CSF CULT: NO GROWTH
SCAN RESULT: NORMAL

## 2018-02-12 NOTE — TELEPHONE ENCOUNTER
I called Sonya Parekh at [facility], she stated the MS panel and NMO panel are pending  However the VZV and HSV are not pending  I reviewed the order sheet with Sonya Parekh, she will be calling back, back line provided

## 2018-02-12 NOTE — TELEPHONE ENCOUNTER
----- Message from Vaughn Young MD sent at 2/9/2018  5:49 PM EST -----  Please follow up with [facility] that csf and serum for ms panel and csf for nmo was received  Just want to make sure it is pending  Also please verify that complete viral pcr panel includes hsv dna pcr and vzv dna pcr    thanks

## 2018-02-13 ENCOUNTER — TELEPHONE (OUTPATIENT)
Dept: NEUROLOGY | Facility: CLINIC | Age: 38
End: 2018-02-13

## 2018-02-13 LAB — Lab: NORMAL

## 2018-02-13 NOTE — TELEPHONE ENCOUNTER
Pt called to report MORE since Sunday afternoon  Pt had LP on Friday  Pt did follow post lumbar instructions (increase her fluid intake etc ) Pt went to urgent care on Sunday and was told HA likely related to cold  Pt took cold medicine yesterday and felt better  However, it returned yesterday and HA a little worse than previous HA  Took tylenol 2x w/ some relief  Rates pain 6/10  States she has dull pressure that worsens when she bends over  Pt does suffer from sinus issues and questioning if related to cold or Lp  Please advise  Cell [phone]  Ok to leave detailed message

## 2018-02-13 NOTE — TELEPHONE ENCOUNTER
Post-LP headaches are severe and always worse/present when standing, and are relieved by laying flat  It is not something that "comes and goes" (it would not be relieved by cold medicine and then come back)    This does not sound like LP headache and should cont to follow with PCP for sinuses

## 2018-02-14 LAB
ALB CSF/SERPL: 2 {RATIO} (ref 0–8)
ALBUMIN CSF-MCNC: 10 MG/DL (ref 11–48)
ALBUMIN SERPL-MCNC: 4.4 G/DL (ref 3.5–5.5)
AQP4 H2O CHANNEL IGG CSF QL: NORMAL
B BURGDOR DNA SPEC QL NAA+PROBE: NEGATIVE
IGG CSF-MCNC: 2.7 MG/DL (ref 0–8.6)
IGG SERPL-MCNC: 900 MG/DL (ref 700–1600)
IGG SYNTH RATE SER+CSF CALC-MRATE: 5.3 MG/DAY
IGG/ALB CLEAR SER+CSF-RTO: 1.3 (ref 0–0.7)
IGG/ALB CSF: 0.27 {RATIO} (ref 0–0.25)
MBP CSF-MCNC: 2.5 NG/ML (ref 0–1.2)
OLIGOCLONAL BANDS.IT SER+CSF QL: ABNORMAL

## 2018-02-15 LAB — ACE CSF-CCNC: 1.9 U/L (ref 0–2.5)

## 2018-02-15 NOTE — TELEPHONE ENCOUNTER
[phone]  I left detailed msg as authorized below  I explained signs/symptoms of spinal HA and offered Vanita's points of reference    I instructed pt to cont to f/u w/PCP for ongoing sinus issues and to please contact us w/any questions/concerns

## 2018-02-16 LAB
BACTERIA ID TEST ISLT QL CULT: NORMAL
BACTERIA SPEC BFLD CULT: NORMAL
GP B STREP AG SPEC QL LA: NEGATIVE
HAEM INFLU B AG SPEC QL LA: NEGATIVE
LABORATORY COMMENT REPORT: NORMAL
N MEN SG A+C+W135+Y AG SPEC QL LA: NEGATIVE
S PNEUM AG SPEC QL LA: NEGATIVE
SPECIMEN SOURCE: NORMAL

## 2018-02-19 LAB
MISCELLANEOUS LAB TEST RESULT: NORMAL
VIRUS SPEC CULT: NORMAL

## 2018-02-20 ENCOUNTER — OFFICE VISIT (OUTPATIENT)
Dept: NEPHROLOGY | Facility: CLINIC | Age: 38
End: 2018-02-20
Payer: COMMERCIAL

## 2018-02-20 VITALS
SYSTOLIC BLOOD PRESSURE: 134 MMHG | HEIGHT: 66 IN | WEIGHT: 293 LBS | DIASTOLIC BLOOD PRESSURE: 68 MMHG | BODY MASS INDEX: 47.09 KG/M2

## 2018-02-20 DIAGNOSIS — E66.09 OBESITY DUE TO EXCESS CALORIES WITHOUT SERIOUS COMORBIDITY, UNSPECIFIED CLASSIFICATION: ICD-10-CM

## 2018-02-20 DIAGNOSIS — I10 HYPERTENSION, UNSPECIFIED TYPE: Primary | ICD-10-CM

## 2018-02-20 LAB
SL AMB  POCT GLUCOSE, UA: NEGATIVE
SL AMB LEUKOCYTE ESTERASE,UA: NEGATIVE
SL AMB POCT BILIRUBIN,UA: NEGATIVE
SL AMB POCT BLOOD,UA: ABNORMAL
SL AMB POCT CLARITY,UA: CLEAR
SL AMB POCT COLOR,UA: YELLOW
SL AMB POCT KETONES,UA: NEGATIVE
SL AMB POCT NITRITE,UA: NEGATIVE
SL AMB POCT PH,UA: 7.5
SL AMB POCT SPECIFIC GRAVITY,UA: 1.01
SL AMB POCT URINE PROTEIN: ABNORMAL
SL AMB POCT UROBILINOGEN: NEGATIVE

## 2018-02-20 PROCEDURE — 99244 OFF/OP CNSLTJ NEW/EST MOD 40: CPT | Performed by: INTERNAL MEDICINE

## 2018-02-20 PROCEDURE — 81002 URINALYSIS NONAUTO W/O SCOPE: CPT | Performed by: INTERNAL MEDICINE

## 2018-02-20 RX ORDER — CHLORTHALIDONE 25 MG/1
25 TABLET ORAL DAILY
Qty: 90 TABLET | Refills: 3 | Status: SHIPPED | OUTPATIENT
Start: 2018-02-20 | End: 2018-06-26

## 2018-02-20 RX ORDER — SERTRALINE HYDROCHLORIDE 25 MG/1
25 TABLET, FILM COATED ORAL DAILY
Refills: 11 | COMMUNITY
Start: 2018-02-04 | End: 2018-02-20 | Stop reason: ALTCHOICE

## 2018-02-20 NOTE — PATIENT INSTRUCTIONS
ASSESSMENT and PLAN:  1  Hypertension, suspect secondary to weight  2  Obesity  3  ?  Multiple sclerosis     · Overall blood pressure today does not seem to be optimally controlled, sepsis to chlorthalidone for hydrochlorothiazide  · Check serum chemistries in 1 week, with plasma metanephrines, aldosterone and renin    · Have suggest weight loss/bariatric consultation, referral to educational classes  · Continue to monitor blood pressure at home, goal less than 140/90  · Follow-up in 3 months to assess blood pressure control

## 2018-02-20 NOTE — PROGRESS NOTES
NEPHROLOGY OUTPATIENT CONSULTATION   Raman Rojas 40 y o  female MRN: [id number]    Reason for consultation:  Hypertension    ASSESSMENT and PLAN:  1  Hypertension, suspect secondary to weight  2  Obesity  3  ?  Multiple sclerosis    · Overall blood pressure today does not seem to be optimally controlled, sepsis to chlorthalidone for hydrochlorothiazide  · Check serum chemistries in 1 week, with plasma metanephrines, aldosterone and renin  · Have suggest weight loss/bariatric consultation, referral to educational classes  · Continue to monitor blood pressure at home, goal less than 140/90  · Follow-up in 3 months to assess blood pressure control      HISTORY OF PRESENT ILLNESS:  We had the pleasure of seeing Dereje Blank for nephrology consultation secondary to uncontrolled hypertension  As we know she is a 28-year-old female with a recent diagnosis of transient visual disturbance  She has seen Neurology in consultation  There was some concern that she may have multiple sclerosis  Her vision has returned to normal   Currently she denies any chest pain, shortness of Breath  She does have some slight lower extremity swelling  Denies any headaches  Review of Systems   Constitutional: Negative for appetite change and fatigue  Eyes: Negative for visual disturbance  Respiratory: Negative for cough and shortness of breath  Cardiovascular: Negative for chest pain and leg swelling  Gastrointestinal: Negative for abdominal distention, abdominal pain, diarrhea, nausea and vomiting  Genitourinary: Negative for dysuria, flank pain and frequency  Musculoskeletal: Negative for arthralgias and back pain  Skin: Negative for wound  Neurological: Negative for dizziness, syncope, weakness, light-headedness and headaches  Psychiatric/Behavioral: Negative for confusion  All other systems reviewed and are negative        PAST MEDICAL HISTORY:  Past Medical History:   Diagnosis Date    Anxiety     Asthma     Hypertension     Murmur, heart     Seasonal allergies     Sinus problem     Tonsillitis        PAST SURGICAL HISTORY:  Past Surgical History:   Procedure Laterality Date    CHOLECYSTECTOMY         ALLERGIES:  No Known Allergies    SOCIAL HISTORY:  History   Alcohol Use    Yes     Comment: rarely     History   Drug Use No     History   Smoking Status    Never Smoker   Smokeless Tobacco    Never Used       FAMILY HISTORY:  Family History   Problem Relation Age of Onset    Asthma Mother     COPD Mother     Eczema Mother     Heart disease Father     Hypertension Father     Heart disease Paternal Aunt     Heart disease Paternal Uncle     Cancer Maternal Grandfather     Glaucoma Maternal Grandfather     Cancer Paternal Grandfather     Hypertension Paternal Grandfather        MEDICATIONS:    Current Outpatient Prescriptions:     ALBUTEROL IN, Inhale 2 puffs as needed, Disp: , Rfl:     amLODIPine (NORVASC) 10 mg tablet, TAKE 1 TABLET BY ORAL ROUTE EVERY DAY, Disp: , Rfl: 5    cetirizine (ZyrTEC) 10 mg tablet, TAKE 1 TABLET BY ORAL ROUTE EVERY DAY, Disp: , Rfl:     fluticasone (FLONASE) 50 mcg/act nasal spray, INHALE 2 SPRAY BY INTRANASAL ROUTE EVERY DAY IN EACH NOSTRIL, Disp: , Rfl: 0    naproxen sodium (ANAPROX) 550 mg tablet, Take 550 mg by mouth, Disp: , Rfl:     sertraline (ZOLOFT) 50 mg tablet, TAKE 1 TABLET BY ORAL ROUTE EVERY DAY, Disp: , Rfl: 11        PHYSICAL EXAM:  Vitals:    02/20/18 0946   BP: 134/68   Weight: (!) 158 kg (348 lb)   Height: 5' 5 83" (1 672 m)       Physical Exam   Constitutional: She is oriented to person, place, and time  She appears well-developed  No distress  HENT:   Head: Normocephalic  Mouth/Throat: Oropharynx is clear and moist    Eyes: Conjunctivae are normal  No scleral icterus  Neck: Neck supple  No JVD present  Cardiovascular: Normal rate, regular rhythm and normal heart sounds      Pulmonary/Chest: Effort normal and breath sounds normal  No respiratory distress  She has no wheezes  She has no rales  Abdominal: Soft  There is no tenderness  Musculoskeletal: She exhibits edema (Trace edema bilaterally)  Neurological: She is alert and oriented to person, place, and time  Skin: Skin is warm and dry  No rash noted  Psychiatric: She has a normal mood and affect  Her behavior is normal    Nursing note and vitals reviewed          Laboratory results:   Results for orders placed or performed in visit on 02/20/18   POCT urine dip   Result Value Ref Range    LEUKOCYTE ESTERASE,UA negative      NITRITE,UA negative     SL AMB POCT UROBILINOGEN negative     SL AMB POCT URINE PROTEIN trace      PH,UA 7 5      BLOOD,UA trace      SPECIFIC GRAVITY,UA 1 010      KETONES,UA negative      BILIRUBIN,UA negative     GLUCOSE, UA negative      COLOR,UA yellow      CLARITY,UA clear

## 2018-02-20 NOTE — LETTER
February 20, 2018     MD Gayatri [address], [place] [address]    Patient: Yevette Crigler   YOB: 1980   Date of Visit: 2/20/2018     Dear Dr Olive Jade      Thank you for referring Ricardo Pagan to me for evaluation  Below are the relevant portions of my assessment and plan of care  If you have questions, please do not hesitate to call me  I look forward to following Starr Gallo along with you  Sincerely,        Mehran Bush, DO        CC: No Recipients    Progress Notes:    ASSESSMENT and PLAN:  1  Hypertension, suspect secondary to weight  2  Obesity  3  ?  Multiple sclerosis     · Overall blood pressure today does not seem to be optimally controlled, sepsis to chlorthalidone for hydrochlorothiazide  · Check serum chemistries in 1 week, with plasma metanephrines, aldosterone and renin    · Have suggest weight loss/bariatric consultation, referral to educational classes  · Continue to monitor blood pressure at home, goal less than 140/90  · Follow-up in 3 months to assess blood pressure control

## 2018-02-22 DIAGNOSIS — G35 MULTIPLE SCLEROSIS (HCC): Primary | ICD-10-CM

## 2018-02-24 LAB
JCPYV AB SERPL QL IA: NEGATIVE
SL AMB INDEX VALUE: 0.09

## 2018-02-26 ENCOUNTER — TELEPHONE (OUTPATIENT)
Dept: NEUROLOGY | Facility: CLINIC | Age: 38
End: 2018-02-26

## 2018-03-09 LAB — S PNEUM AG SPEC QL LA: NORMAL

## 2018-03-12 LAB — FUNGUS SPEC CULT: NORMAL

## 2018-03-13 ENCOUNTER — TELEPHONE (OUTPATIENT)
Dept: NEPHROLOGY | Facility: CLINIC | Age: 38
End: 2018-03-13

## 2018-03-13 DIAGNOSIS — I10 HYPERTENSION, UNSPECIFIED TYPE: ICD-10-CM

## 2018-03-13 DIAGNOSIS — E66.09 OBESITY DUE TO EXCESS CALORIES, UNSPECIFIED OBESITY SEVERITY: Primary | ICD-10-CM

## 2018-03-13 NOTE — TELEPHONE ENCOUNTER
Patient states she got her labs done on Saturday at [facility]  The phlebotomist did not do draw the renal function panel or collect the microalbumin/creatinine urine ratio and the uric acid as the dates on the order stated 4/20/18

## 2018-03-19 LAB
ALBUMIN SNV-MCNC: 3.5 G/DL
BUN SERPL-MCNC: 12 MG/DL (ref 5–25)
CALCIUM SERPL-MCNC: 9.1 MG/DL (ref 8.3–10.1)
CHLORIDE SERPL-SCNC: 103 MMOL/L (ref 100–108)
CO2 SERPL-SCNC: 28 MMOL/L
CREAT SERPL-MCNC: 0.59 MG/DL (ref 0.6–1.3)
GLUCOSE SERPL-MCNC: 92 MG/DL
MICROALBUMIN/CREAT 24H UR: 6 MG/G CREATININE (ref 0–30)
PHOSPHATE SERPL-MCNC: 3.3 MG/DL (ref 2.7–4.5)
POTASSIUM SERPL-SCNC: 3.5 MMOL/L (ref 3.5–5.3)
SODIUM SERPL-SCNC: 141 MMOL/L (ref 136–145)
URATE SERPL-MCNC: 6.1 MG/DL (ref 2–6.8)

## 2018-03-20 ENCOUNTER — OFFICE VISIT (OUTPATIENT)
Dept: NEUROLOGY | Facility: CLINIC | Age: 38
End: 2018-03-20
Payer: COMMERCIAL

## 2018-03-20 VITALS
WEIGHT: 293 LBS | HEIGHT: 64 IN | DIASTOLIC BLOOD PRESSURE: 65 MMHG | BODY MASS INDEX: 50.02 KG/M2 | SYSTOLIC BLOOD PRESSURE: 135 MMHG | HEART RATE: 95 BPM

## 2018-03-20 DIAGNOSIS — G35 MULTIPLE SCLEROSIS (HCC): Primary | ICD-10-CM

## 2018-03-20 PROCEDURE — 99215 OFFICE O/P EST HI 40 MIN: CPT | Performed by: PSYCHIATRY & NEUROLOGY

## 2018-03-20 NOTE — PATIENT INSTRUCTIONS
Pt here for neuro follow up   Extended appt due to review of ms and review of lp  Pt lp results positive 4 ocbs, pos mbp , elevated igg synth rate  Negative gram stain  Negative lyme pcr  jcv serum negative at 0 09  Rev pathophysiology of ms  Rev all sxs and presentation and imaging and lp findings all consistent with ms  Rev all imd options with pt  Pt is very well educated about her meds and options  Also she checked with her insurance company as well

## 2018-03-20 NOTE — PROGRESS NOTES
Patient ID: Paul Perry is a 40 y o  female  Assessment/Plan:    Multiple sclerosis (HCC)  Pt here for neuro follow up   Extended appt due to review of ms and review of lp  Pt lp results positive 4 ocbs, pos mbp , elevated igg synth rate  Negative gram stain  Negative lyme pcr  jcv serum negative at 0 09  Rev pathophysiology of ms  Rev all sxs and presentation and imaging and lp findings all consistent with ms  Rev all imd options with pt  Pt is very well educated about her meds and options  Also she checked with her insurance company as well  Pt to call in the next few days as she has narrowed her choices to copaxone or tecfidera       Diagnoses and all orders for this visit:    Multiple sclerosis ([facility] [address] )  -     CBC and differential; Future  -     Hepatic function panel; Future           Subjective:    HPI  HPI: pt is a 39 yo f with pmh of htn for many yrs  pt tried on mutliple meds  pt with issues with bp since her 25s  pt has not been seen by renal in the past  pt pt with onset of right visual field disturbance in rub[place] nov  At time of presentation, pt noted needing to move her head to get full picture on the right side and to see her colleague on the right side was not as clear  pt recalls having to move her actual head to the right to see him better  Pt last seen in [address]  Since last visit pt had lp done  Pt notes procedure went well  Extended appt today ie over 65 min to review all of her studies to date with pt, spouse and her sister  Also rev ms diagnosis and pathophysiology of ms  Also rev treatment options for ms  Pt is jcv neg at 0 09  Rev lp results in detail with pt  Neg gram stain, neg lyme pcr  Pos ocb of 4 ( 4 ocbs seen in the csf only , in addition 4 paired observed in both csf and serum), pos mbp of 2 5 and pos igg synth rate of 5 3  Neg  Csf nmo  Neg ace csf   2 wbc  Viral comprehensive pcr negative  Negative for atypical cells    Pt also has been seen by renal and has labs for nephrology  Pt will be following up with renal as well  Pt denies any new sxs  No falls or trips  No changes in bowel or bladder  No loss of vision,  No diplopia  No n or v   No ha  No fever of chills  No vertigo  No facial paresthesias  No loc  Pt has already followed up with optho  re rev all imaging  mri head in detail with pt  at appt  there are multiple supra and infra tentorial lesions with varying degrees of enh and others non enh  lesions noted in the left cerebellar hemisphre measuring 9 mm with rim enh  multiple supratentorial lesions and periventricular in location  some of the pv lesions have a perpendicular orientation to the body of the LV  the largest is in the right temproal parietal area 11 mm with partial enh of the lesion  there is a 5mm enh lesion within the occipal horn of the left LV  Re rev with pat and family   that the combination of  Findings ie the presentation, exam, imaging and lp most likely represent MS  the supratentorial lesions are noted to have some of which demonstrate enh  MS considered most likely with other etiologies less likley  rev inflammatory, infectious and neoplastic with pt as well  pt had lyme testing done and told neg  This was repeated since our last visit and also neg  re rev Mri c and t spine rev at appt and no intramedullary lesions seen, no enh  Neg cardiolipin ab, lyme neg in serum, sjogrens neg, factor v not detected  Due to varying degrees of enh and old lesions both supra and infratentorially, highly suspicious of Ms  Rev with pt multiple IMD therapies as well as pros and cons of each  Rev all meds and side effect profile  pt was well prepared today as she has also rev med options and literature  Pt also checked with her insurance company as well  Pt is of child bearing yrs  Rev all pros and cons of all avail meds today with pt  Pt has narrowed her choices between copaxone and tecfidera    She would like to discuss cortez with her family and will call me within the next few days to decide  Pt is currently not interested in any med with risk of pml despite her neg jcv  Pt is aware these are all options  Pt will call to notify us which med she would like to pursue  Pt is unsure if she could do injectibles and wants to also weigh the oral option      Total time spent today 65 minutes  Greater than 50% of total time was spent with the patient and / or family counseling and / or coordination of care            The following portions of the patient's history were reviewed and updated as appropriate: allergies, current medications, past family history, past medical history, past social history, past surgical history and problem list          Objective:    Blood pressure 135/65, pulse 95, height 5' 4" (1 626 m), weight (!) 157 kg (346 lb)  Physical Exam   Constitutional: She appears well-developed and well-nourished  Eyes: EOM are normal  Pupils are equal, round, and reactive to light  Cardiovascular: Intact distal pulses  Neurological: She has normal strength and normal reflexes  Gait normal    Psychiatric: Her speech is normal        Neurological Exam    Mental Status  The patient is alert and oriented to person, place, time, and situation  Her recent and remote memory are normal  Her speech is normal  Her language is fluent with no aphasia  She has normal attention span and concentration  She has a normal fund of knowledge  Cranial Nerves    CN II: The patient's visual acuity and visual fields are normal   CN III, IV, VI: The patient's pupils are equally round and reactive to light and ocular movements are normal   CN V: The patient has normal facial sensation  CN VII:  The patient has symmetric facial movement  CN VIII:  The patient's hearing is normal   CN IX, X: The patient has symmetric palate movement and normal gag reflex    CN XI: The patient's shoulder shrug strength is normal   CN XII: The patient's tongue is midline without atrophy or fasciculations  Motor  The patient has normal muscle bulk throughout  Her overall muscle tone is normal throughout  Her strength is 5/5 throughout all four extremities  Sensory  The patient's sensation is normal in all four extremities  Reflexes  Deep tendon reflexes are 2+ and symmetric in all four extremities with downgoing toes bilaterally  Gait and Coordination  The patient has normal gait and station  She has normal right finger to nose and normal left finger to nose coordination  ROS:    Review of Systems   Constitutional: Positive for fatigue  Negative for appetite change and fever  HENT: Positive for sinus pain, sinus pressure and tinnitus  Negative for hearing loss, trouble swallowing and voice change  Eyes: Negative  Negative for photophobia and pain  Respiratory: Negative  Negative for shortness of breath  Cardiovascular: Negative  Negative for palpitations  Gastrointestinal: Negative  Negative for nausea and vomiting  Endocrine: Negative  Negative for cold intolerance and heat intolerance  Genitourinary: Negative  Negative for dysuria, frequency and urgency  Musculoskeletal: Negative  Negative for myalgias and neck pain  Skin: Negative  Negative for rash  Neurological: Positive for numbness  Negative for dizziness, tremors, seizures, syncope, facial asymmetry, speech difficulty, weakness, light-headedness and headaches  Cramping  tigling    Hematological: Negative  Does not bruise/bleed easily  Psychiatric/Behavioral: Negative for confusion, hallucinations and sleep disturbance  The patient is nervous/anxious           Increased sleepiness

## 2018-03-20 NOTE — ASSESSMENT & PLAN NOTE
Pt here for neuro follow up   Extended appt due to review of ms and review of lp  Pt lp results positive 4 ocbs, pos mbp , elevated igg synth rate  Negative gram stain  Negative lyme pcr  jcv serum negative at 0 09  Rev pathophysiology of ms  Rev all sxs and presentation and imaging and lp findings all consistent with ms  Rev all imd options with pt  Pt is very well educated about her meds and options  Also she checked with her insurance company as well  Pt to call in the next few days as she has narrowed her choices to copaxone or tecfidera

## 2018-03-21 ENCOUNTER — TELEPHONE (OUTPATIENT)
Dept: NEUROLOGY | Facility: CLINIC | Age: 38
End: 2018-03-21

## 2018-03-21 DIAGNOSIS — G35 MULTIPLE SCLEROSIS (HCC): Primary | ICD-10-CM

## 2018-03-21 NOTE — TELEPHONE ENCOUNTER
Let pt know I would like for her to get a repeat sed rate, giovani and ace level to complete work up for any other autoimmune processes for her  If her sed rate remains elevated, we will also refer her to rheumatology

## 2018-03-29 DIAGNOSIS — R70.0 ELEVATED SEDIMENTATION RATE: Primary | ICD-10-CM

## 2018-03-30 ENCOUNTER — TELEPHONE (OUTPATIENT)
Dept: NEUROLOGY | Facility: CLINIC | Age: 38
End: 2018-03-30

## 2018-04-03 NOTE — TELEPHONE ENCOUNTER
Pt called confirmed that she never signed the copaxone form  maura Cuellar mail the form as requested         Thank you

## 2018-04-03 NOTE — TELEPHONE ENCOUNTER
Please check with vale rossi, pt may have signed the copaxone form already  If not, she will need to come in for signature

## 2018-04-10 NOTE — TELEPHONE ENCOUNTER
pt called and states that she received copaxone start form  it is checked off daily, she states that she would rather do 3 times a week  are you ok with her taking 3 times a week? can she just check off 3 times a week and kenisha error next to daily    [phone]

## 2018-05-01 ENCOUNTER — TELEPHONE (OUTPATIENT)
Dept: NEUROLOGY | Facility: CLINIC | Age: 38
End: 2018-05-01

## 2018-05-01 NOTE — LETTER
To whom it may concern,     Roxanne Salts  1980, is under our care  Utilizes injectable medication Copaxone and will need to travel with this        Thank you,  EFE Pardo

## 2018-05-01 NOTE — TELEPHONE ENCOUNTER
Patient will be traveling soon and is requesting letter for her Copaxone  Letter generated for your review      [phone]

## 2018-06-21 ENCOUNTER — TELEPHONE (OUTPATIENT)
Dept: NEUROLOGY | Facility: CLINIC | Age: 38
End: 2018-06-21

## 2018-06-21 NOTE — TELEPHONE ENCOUNTER
Called [facility] for pt not an issues if she missed 1 dose of her Copaxone, no need to replace, just take next dose when due

## 2018-06-21 NOTE — TELEPHONE ENCOUNTER
pt called and states that she messed up one of her copaxone injections  She called [facility] and they stated that [facility] would dispense 1 syringe  evelia brandt is requesting a script for for 1 syringe  Please send script    [phone] option 5  [phone]

## 2018-06-25 RX ORDER — LISINOPRIL AND HYDROCHLOROTHIAZIDE 12.5; 1 MG/1; MG/1
TABLET ORAL
COMMUNITY
Start: 2018-05-10 | End: 2018-06-26

## 2018-06-26 ENCOUNTER — OFFICE VISIT (OUTPATIENT)
Dept: NEUROLOGY | Facility: CLINIC | Age: 38
End: 2018-06-26
Payer: COMMERCIAL

## 2018-06-26 VITALS
DIASTOLIC BLOOD PRESSURE: 72 MMHG | BODY MASS INDEX: 50.02 KG/M2 | HEART RATE: 87 BPM | HEIGHT: 64 IN | WEIGHT: 293 LBS | SYSTOLIC BLOOD PRESSURE: 160 MMHG

## 2018-06-26 DIAGNOSIS — G56.03 BILATERAL CARPAL TUNNEL SYNDROME: ICD-10-CM

## 2018-06-26 DIAGNOSIS — G35 MULTIPLE SCLEROSIS (HCC): Primary | ICD-10-CM

## 2018-06-26 DIAGNOSIS — R70.0 ELEVATED SED RATE: ICD-10-CM

## 2018-06-26 PROCEDURE — 99214 OFFICE O/P EST MOD 30 MIN: CPT | Performed by: PHYSICIAN ASSISTANT

## 2018-06-26 RX ORDER — DILTIAZEM HYDROCHLORIDE 90 MG/1
90 CAPSULE, EXTENDED RELEASE ORAL DAILY
COMMUNITY
End: 2019-10-09 | Stop reason: ALTCHOICE

## 2018-06-26 RX ORDER — HYDROCHLOROTHIAZIDE 12.5 MG/1
12.5 CAPSULE, GELATIN COATED ORAL DAILY
COMMUNITY
End: 2019-10-09 | Stop reason: ALTCHOICE

## 2018-06-26 RX ORDER — POTASSIUM CHLORIDE 750 MG/1
10 CAPSULE, EXTENDED RELEASE ORAL DAILY
COMMUNITY
End: 2019-10-09 | Stop reason: ALTCHOICE

## 2018-06-26 NOTE — PATIENT INSTRUCTIONS
Will get MRI brain done now as a baseline while just starting Copaxone  Pls get CD of images from your November 2017 brain MRI and bring it to the MRI appt with you  Continue Copaxone  Would suggest getting over the counter wrist splints to wear at night for suspected carpal tunnel  Will get labs done again before next visit in 4 months   Follow up in 4 months or sooner if needed  Call for any new symptoms

## 2018-06-26 NOTE — PROGRESS NOTES
Patient ID: Александр Avila is a 40 y o  female  Assessment/Plan:    [facility]  Patient with newly diagnosed MS  She recently started Copaxone about a month ago and overall tolerating well  I would like to update MRI brain now, as a baseline while just starting her Copaxone  Last MRI was her initial MRI done in Nov 2017  Will update labs before her next visit  She denies any new symptoms at this time  Her exam is stable today  She will cont Copaxone 40mg TIW and return in 4 months  She will call for any new symptoms  Bilateral carpal tunnel syndrome  Patient with tingling in the fingers, left greater than right hand, mainly present when doing a lot of work on the computer  Symptoms present even prior to her presentation in Nov 2017  Reviewed negative cord imaging and that these symptoms are likely more peripheral, most consistent with CTS  Suggested wrist splints at night, which she is willing to try  Consider EMG if symptoms persist/progress    Elevated sed rate  Patient has upcoming appt with rheumatology for persistently elevated sed rate  Other labs such as RF, ANDREA, Sjogrens have been negative  Diagnoses and all orders for this visit:    Multiple sclerosis ([facility] )  -     MRI brain MS wo and w contrast; Future  -     CBC and differential; Future  -     Comprehensive metabolic panel; Future  -     BUN; Future  -     Creatinine, serum; Future    Bilateral carpal tunnel syndrome    Elevated sed rate    Other orders         Subjective:    HPI    Patient is a 79-year-old female with past medical history of hypertension for many years  Patient with onset of right visual field disturbance in early November 2017  At time of presentation, patient noted needing to move her head dated full picture on the right side to see her colleague who was sitting on the right side of her    MRI brain 11/24/17 with multiple supra and infratentorial lesions with varying degrees of enhancement and others nonenhancing  Lesions noted in the left cerebellar hemisphere measuring 9 mm with rim enhancement  Multiple supratentorial lesions and periventricular in location  Some of the periventricular lesions have a perpendicular orientation to the body of the lateral ventricle  The largest is in the right temporal parietal area all 11 mm with partial enhancement of the lesion  There is a 5 mm enhancing lesion within the occipital horn of the lateral ventricle  MRI cervical spine and MRI thoracic spine were completed in December 2017  No cord lesions found on either study  Patient had a lumbar puncture done as well  Negative Gram stain, negative Lyme PC R  MS panel with positive oligoclonal bands of 4 (4 oligoclonal bands in the CSF only, in addition for paired bands in both CSF and serum), elevated myelin basic protein 2 5 and elevated IgG synthesis rate of 5 3  Negative CSF NMO  Negative CSF ACE  Viral comprehensive PCR negative  Due to positive LP as well as multiple enhancing and nonenhancing lesions seen on MRI brain, diagnosis of MS was made  She was JCV negative with index 0 09  Patient chose to start Copaxone as her 1st IMD therapy  Patient has also had a persistently elevated sed rate  Other labs including serum ACE, rheumatoid factor, ANDREA, Sjogren's have all been negative  She was referred to Rheumatology  Today, patient reports she is overall doing well  She started Copaxone on 6/3/18  She is overall tolerating the injections well, denies any major issues with the sites  She notes some intermittent tingling in the fingers on left greater than right hand, but this has been since before November when she first presented with her diplopia  Reviewed MRI c-spine with no cord lesions  Her symptoms are mainly present when she is on the computer all day, 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  She denies any changes in bowel or bladder, speech or swallowing  No vision changes, vertigo    She is seeing the rheumatologist next month for the persistently elevated sed rate  The following portions of the patient's history were reviewed and updated as appropriate: current medications, past family history, past medical history, past social history, past surgical history and problem list          Objective:    Blood pressure 160/72, pulse 87, height 5' 4" (1 626 m), weight (!) 156 kg (344 lb)  Physical Exam   Constitutional:   obese   HENT:   Head: Normocephalic and atraumatic  Eyes: EOM are normal  Pupils are equal, round, and reactive to light  Cardiovascular: Intact distal pulses  Neurological: She has normal strength and normal reflexes  Gait and coordination normal    Skin: Skin is warm and dry  Psychiatric: She has a normal mood and affect  Her speech is normal        Neurological Exam    Mental Status  The patient is alert and oriented to person, place, time, and situation  Her recent and remote memory are normal  Her speech is normal  Her language is fluent with no aphasia  She has normal attention span and concentration  She has a normal fund of knowledge  Cranial Nerves    CN II: The patient's visual acuity and visual fields are normal   CN III, IV, VI: The patient's pupils are equally round and reactive to light and ocular movements are normal   CN V: The patient has normal facial sensation  CN VII:  The patient has symmetric facial movement  CN VIII:  The patient's hearing is normal   CN IX, X: The patient has symmetric palate movement and normal gag reflex  CN XI: The patient's shoulder shrug strength is normal   CN XII: The patient's tongue is midline without atrophy or fasciculations  Motor  The patient has normal muscle bulk throughout  Her overall muscle tone is normal throughout  Her strength is 5/5 throughout all four extremities  Sensory  The patient's sensation is normal in all four extremities      Reflexes  Deep tendon reflexes are 2+ and symmetric in all four extremities with downgoing toes bilaterally  Gait and Coordination  The patient has normal gait and station  She has normal coordination bilaterally  ROS:    Review of Systems   Constitutional: Negative  HENT: Negative  Eyes: Negative  Respiratory: Negative  Cardiovascular: Positive for leg swelling  Gastrointestinal: Negative  Endocrine: Negative  Genitourinary: Negative  Musculoskeletal: Positive for back pain  Skin: Negative  Allergic/Immunologic: Negative  Neurological: Positive for numbness (occ) and headaches (occ)  Hematological: Bruises/bleeds easily  Psychiatric/Behavioral: Negative

## 2018-06-26 NOTE — ASSESSMENT & PLAN NOTE
Patient has upcoming appt with rheumatology for persistently elevated sed rate  Other labs such as RF, ANDREA, Sjogrens have been negative

## 2018-06-26 NOTE — ASSESSMENT & PLAN NOTE
Patient with tingling in the fingers, left greater than right hand, mainly present when doing a lot of work on the computer  Symptoms present even prior to her presentation in Nov 2017  Reviewed negative cord imaging and that these symptoms are likely more peripheral, most consistent with CTS  Suggested wrist splints at night, which she is willing to try    Consider EMG if symptoms persist/progress

## 2018-06-26 NOTE — ASSESSMENT & PLAN NOTE
Patient with newly diagnosed MS  She recently started Copaxone about a month ago and overall tolerating well  I would like to update MRI brain now, as a baseline while just starting her Copaxone  Last MRI was her initial MRI done in Nov 2017  Will update labs before her next visit  She denies any new symptoms at this time  Her exam is stable today  She will cont Copaxone 40mg TIW and return in 4 months  She will call for any new symptoms

## 2018-07-13 ENCOUNTER — HOSPITAL ENCOUNTER (OUTPATIENT)
Dept: RADIOLOGY | Facility: HOSPITAL | Age: 38
Discharge: HOME | End: 2018-07-13
Payer: COMMERCIAL

## 2018-07-13 DIAGNOSIS — G35 MULTIPLE SCLEROSIS (HCC): ICD-10-CM

## 2018-07-13 PROCEDURE — 70553 MRI BRAIN STEM W/O & W/DYE: CPT

## 2018-07-13 PROCEDURE — A9585 GADOBUTROL INJECTION: HCPCS | Performed by: PHYSICIAN ASSISTANT

## 2018-07-13 RX ADMIN — GADOBUTROL 15 ML: 604.72 INJECTION INTRAVENOUS at 18:34

## 2018-07-16 ENCOUNTER — TELEPHONE (OUTPATIENT)
Dept: NEUROLOGY | Facility: CLINIC | Age: 38
End: 2018-07-16

## 2018-07-16 NOTE — TELEPHONE ENCOUNTER
Received patient's MRI results  Some lesions appear smaller, but she does have 2 new, enhancing lesions within the right hemisphere adjacent to the occipital horn of the right lateral ventricle and within the right posterior frontal lobe  Called patient  No new symptoms to report, feels at baseline  Will hold on steroids  We were doing this MRI just as she was starting Copaxone so we could get a good baseline as last MRI was done in Nov 2017  Will cont with Copaxone and repeat MRI brain in 6 months  Patient told to call our office for any new symptoms

## 2018-10-15 RX ORDER — POTASSIUM CHLORIDE 750 MG/1
TABLET, EXTENDED RELEASE ORAL
COMMUNITY
Start: 2018-07-13 | End: 2019-10-09 | Stop reason: ALTCHOICE

## 2018-10-16 ENCOUNTER — OFFICE VISIT (OUTPATIENT)
Dept: FAMILY MEDICINE CLINIC | Facility: CLINIC | Age: 38
End: 2018-10-16
Payer: COMMERCIAL

## 2018-10-16 VITALS
BODY MASS INDEX: 50.02 KG/M2 | WEIGHT: 293 LBS | DIASTOLIC BLOOD PRESSURE: 78 MMHG | TEMPERATURE: 97 F | HEART RATE: 99 BPM | OXYGEN SATURATION: 99 % | HEIGHT: 64 IN | SYSTOLIC BLOOD PRESSURE: 120 MMHG

## 2018-10-16 DIAGNOSIS — M18.11 PRIMARY OSTEOARTHRITIS OF FIRST CARPOMETACARPAL JOINT OF RIGHT HAND: Primary | ICD-10-CM

## 2018-10-16 DIAGNOSIS — G56.03 BILATERAL CARPAL TUNNEL SYNDROME: ICD-10-CM

## 2018-10-16 DIAGNOSIS — G35 CHRONIC PROGRESSIVE MULTIPLE SCLEROSIS (HCC): ICD-10-CM

## 2018-10-16 DIAGNOSIS — E66.01 MORBID OBESITY (HCC): ICD-10-CM

## 2018-10-16 DIAGNOSIS — I10 BENIGN ESSENTIAL HYPERTENSION: ICD-10-CM

## 2018-10-16 DIAGNOSIS — F41.1 GENERALIZED ANXIETY DISORDER: ICD-10-CM

## 2018-10-16 PROBLEM — E55.9 VITAMIN D DEFICIENCY: Status: ACTIVE | Noted: 2018-06-14

## 2018-10-16 PROBLEM — H53.2 MONOCULAR DIPLOPIA: Status: ACTIVE | Noted: 2017-11-17

## 2018-10-16 PROCEDURE — 99214 OFFICE O/P EST MOD 30 MIN: CPT | Performed by: INTERNAL MEDICINE

## 2018-10-16 PROCEDURE — 20600 DRAIN/INJ JOINT/BURSA W/O US: CPT | Performed by: INTERNAL MEDICINE

## 2018-10-16 PROCEDURE — 3074F SYST BP LT 130 MM HG: CPT | Performed by: INTERNAL MEDICINE

## 2018-10-16 PROCEDURE — 3078F DIAST BP <80 MM HG: CPT | Performed by: INTERNAL MEDICINE

## 2018-10-16 PROCEDURE — 3008F BODY MASS INDEX DOCD: CPT | Performed by: INTERNAL MEDICINE

## 2018-10-16 NOTE — PROGRESS NOTES
Procedures  Under sterile technique with alcohol cleansing, after informed consent and was given by the patient and all questions answered, the right 1st carpal metacarpal joint was marked, a time-out, than the right 1st carpometacarpal joint was injected with a 0 5 cc of 2% xylocaine and 0 5 cc of 40 milligrams/cc Kenalog without incident  The area was covered with a Band-Aid

## 2018-10-16 NOTE — PROGRESS NOTES
Assessment/Plan:  The right 1st carpometacarpal joint was injected under sterile technique with a 0 5 cc 2% xylocaine and 0 5 cc of 40 milligram/cc Kenalog with partial relief  This was a separate procedure  Patient will remain on the same medications for multiple sclerosis hypertension and vitamin-D deficiency and be seen for follow-up examination approximately 4 months time    Diet reviewed  Lifestyle modifications reviewed  Medications reviewed and ordered  Laboratory tests and studies reviewed and ordered  All patient's questions answered to patient satisfaction  Diagnoses and all orders for this visit:    Primary osteoarthritis of first carpometacarpal joint of right hand    Generalized anxiety disorder    Morbid obesity (HCC)    Chronic progressive multiple sclerosis (HCC)    Bilateral carpal tunnel syndrome    Benign essential hypertension    Other orders  -     KLOR-CON M10 10 MEQ tablet;         Subjective:      Patient ID: Leyda Mac is a 41 y o  female  HPI  This 80-year-old female receive relief from her right 1st metacarpal carpal joint pain 3 months ago when I injected the area with xylocaine and Kenalog  The discomfort has now recurred and she is requesting another injection  She thinks she may have re-injured it opening a jar  Blood pressure is well controlled with the patient continues to have 3+ ankle edema which presumably is related to her calcium channel blocker Cardizem  She is also on low-dose hydrochlorothiazide and potassium  She is not using any birth control and wishes she were pregnant so I am reluctant to change her to a to medicine other than a calcium channel blocker at this time  Weight loss was strongly encouraged      Current Outpatient Prescriptions:     ALBUTEROL IN, Inhale 2 puffs as needed, Disp: , Rfl:     cetirizine (ZyrTEC) 10 mg tablet, TAKE 1 TABLET BY ORAL ROUTE EVERY DAY, Disp: , Rfl:     diltiazem (CARDIZEM SR) 90 mg 12 hr capsule, Take 90 mg by mouth daily, Disp: , Rfl:     fluticasone (FLONASE) 50 mcg/act nasal spray, INHALE 2 SPRAY BY INTRANASAL ROUTE EVERY DAY IN EACH NOSTRIL, Disp: , Rfl: 0    hydrochlorothiazide (MICROZIDE) 12 5 mg capsule, Take 12 5 mg by mouth daily, Disp: , Rfl:     KLOR-CON M10 10 MEQ tablet, , Disp: , Rfl:     potassium chloride (MICRO-K) 10 MEQ CR capsule, Take 10 mEq by mouth daily Taking two tabs, Disp: , Rfl:     sertraline (ZOLOFT) 50 mg tablet, TAKE 1 TABLET BY ORAL ROUTE EVERY DAY, Disp: , Rfl: 11    naproxen sodium (ANAPROX) 550 mg tablet, Take 550 mg by mouth, Disp: , Rfl:     The following portions of the patient's history were reviewed and updated as appropriate: allergies, current medications, past family history, past medical history, past social history, past surgical history and problem list     Review of Systems   Constitutional: Negative for appetite change, fatigue, fever and unexpected weight change  HENT: Negative for rhinorrhea, sinus pain, sinus pressure, sneezing and sore throat  Eyes: Negative for visual disturbance  Respiratory: Negative for cough, chest tightness, shortness of breath and wheezing  Cardiovascular: Negative for chest pain, palpitations and leg swelling  Gastrointestinal: Negative for abdominal distention, abdominal pain, blood in stool, constipation, diarrhea, nausea and vomiting  Endocrine: Negative for polydipsia and polyuria  Genitourinary: Negative for decreased urine volume, difficulty urinating, dysuria, hematuria and urgency  Musculoskeletal: Negative for arthralgias, back pain, joint swelling and neck pain  Skin: Negative for rash  Allergic/Immunologic: Negative for environmental allergies  Neurological: Negative for tremors, weakness, light-headedness, numbness and headaches  Hematological: Does not bruise/bleed easily  Psychiatric/Behavioral: Negative for agitation, behavioral problems, confusion and dysphoric mood   The patient is not nervous/anxious  Family History   Problem Relation Age of Onset    Asthma Mother     COPD Mother     Eczema Mother     Emphysema Mother    [place] Heart disease Father     Hypertension Father     Asthma Father     Alcohol abuse Father     Heart disease Paternal Aunt     Heart disease Paternal Uncle     Cancer Maternal Grandfather     Glaucoma Maternal Grandfather     Asthma Maternal Grandfather     Emphysema Maternal Grandfather     Cancer Paternal Grandfather     Hypertension Paternal Grandfather     Hypertension Maternal Grandmother     Emphysema Maternal Grandmother     Asthma Maternal Grandmother        Past Medical History:   Diagnosis Date    Anxiety     Asthma     Hypertension     Murmur, heart     Seasonal allergies     Sinus problem     Tonsillitis        Past Surgical History:   Procedure Laterality Date    CHOLECYSTECTOMY         Social History     Social History    Marital status: /Civil Union     Spouse name: N/A    Number of children: N/A    Years of education: N/A     Social History Main Topics    Smoking status: Never Smoker    Smokeless tobacco: Never Used      Comment: no passive smoke exposure    Alcohol use Yes      Comment: rarely    Drug use: No    Sexual activity: Yes     Partners: Male     Other Topics Concern    None     Social History Narrative    None       No Known Allergies      Objective:      /78 (BP Location: Right arm, Patient Position: Sitting, Cuff Size: Large)   Pulse 99   Temp (!) 97 °F (36 1 °C) (Tympanic)   Ht 5' 4" (1 626 m)   Wt (!) 156 kg (344 lb)   SpO2 99%   BMI 59 05 kg/m²        Physical Exam   Constitutional: She is oriented to person, place, and time  She appears well-developed and well-nourished  No distress  HENT:   Head: Normocephalic and atraumatic  Nose: Nose normal    Mouth/Throat: Oropharynx is clear and moist  No oropharyngeal exudate  Eyes: Pupils are equal, round, and reactive to light  Conjunctivae and EOM are normal  No scleral icterus  Neck: Normal range of motion  Neck supple  No JVD present  No tracheal deviation present  No thyromegaly present  Cardiovascular: Normal rate, regular rhythm and normal heart sounds  Exam reveals no gallop and no friction rub  No murmur heard  Pulmonary/Chest: Effort normal  No respiratory distress  She has no wheezes  She has no rales  She exhibits no tenderness  Abdominal: Soft  Bowel sounds are normal  She exhibits no distension and no mass  There is no tenderness  There is no rebound and no guarding  Musculoskeletal: Normal range of motion  She exhibits edema  She exhibits no deformity  Lymphadenopathy:     She has no cervical adenopathy  Neurological: She is alert and oriented to person, place, and time  No cranial nerve deficit  Coordination normal    Skin: Skin is warm and dry  No rash noted  Psychiatric: She has a normal mood and affect   Her behavior is normal  Judgment and thought content normal

## 2018-10-30 ENCOUNTER — OFFICE VISIT (OUTPATIENT)
Dept: NEUROLOGY | Facility: CLINIC | Age: 38
End: 2018-10-30
Payer: COMMERCIAL

## 2018-10-30 VITALS
HEART RATE: 78 BPM | RESPIRATION RATE: 16 BRPM | BODY MASS INDEX: 59.15 KG/M2 | DIASTOLIC BLOOD PRESSURE: 74 MMHG | SYSTOLIC BLOOD PRESSURE: 170 MMHG | WEIGHT: 293 LBS

## 2018-10-30 DIAGNOSIS — G35 MULTIPLE SCLEROSIS (HCC): Primary | ICD-10-CM

## 2018-10-30 PROCEDURE — 99214 OFFICE O/P EST MOD 30 MIN: CPT | Performed by: PSYCHIATRY & NEUROLOGY

## 2018-10-30 NOTE — PROGRESS NOTES
Patient ID: Alvin Lantigua is a 41 y o  female  Assessment/Plan:    Multiple sclerosis (HCC)  Pt here for neuro follow up  Last seen in June 26 2018 by bony jenkins  Pt overall doing well since last visit  Pt had updated mri head on July 13 2018 with 2 new enh lesions seen in right hemisphere adj to the right occipital horn of the lv and right frontal lobe  No associated sxs  Rev imaging also at time of study  Pt had only been on copaxone for about 5 weeks at time of study as pt started med on Renate 3 2018  Pt to cont with med and call for any new sxs   Exam stable today  Will update all imaging as last cord studies about 10 months ago and need follow up after pt on med 6 months with updated mri head       Diagnoses and all orders for this visit:    Multiple sclerosis ([address] )  -     MRI brain MS wo and w contrast; Future  -     MRI cervical spine with and without contrast; Future  -     MRI thoracic spine with and without contrast; Future  -     CBC and differential; Future  -     Comprehensive metabolic panel; Future           Subjective:    HPI    Patient is a 45-year-old female with past medical history of hypertension for many years  Pt here for neuro follow up  Pt last seen in June 26 2018 by bony jenkins  Per bony last note "  Patient with onset of right visual field disturbance in early November 2017  At time of presentation, patient noted needing to move her head dated full picture on the right side to see her colleague who was sitting on the right side of her  MRI brain 11/24/17 with multiple supra and infratentorial lesions with varying degrees of enhancement and others nonenhancing  Lesions noted in the left cerebellar hemisphere measuring 9 mm with rim enhancement  Multiple supratentorial lesions and periventricular in location  Some of the periventricular lesions have a perpendicular orientation to the body of the lateral ventricle    The largest is in the right temporal parietal area all 11 mm with partial enhancement of the lesion  There is a 5 mm enhancing lesion within the occipital horn of the lateral ventricle  MRI cervical spine and MRI thoracic spine were completed in December 2017  No cord lesions found on either study  Patient had a lumbar puncture done as well  Negative Gram stain, negative Lyme PC R  MS panel with positive oligoclonal bands of 4 (4 oligoclonal bands in the CSF only, in addition for paired bands in both CSF and serum), elevated myelin basic protein 2 5 and elevated IgG synthesis rate of 5 3  Negative CSF NMO  Negative CSF ACE  Viral comprehensive PCR negative  Due to positive LP as well as multiple enhancing and nonenhancing lesions seen on MRI brain, diagnosis of MS was made  She was JCV negative with index 0 09  Patient chose to start Copaxone as her 1st IMD therapy   "          Kept above paragraph due to complexities of pt case  Pt overall doing well since last visit  Pt had updated mri head on July 13 2018 with 2 new enh lesions seen in right hemisphere adj to the right occipital horn of the lv and right frontal lobe  No associated sxs  Rev imaging also at time of study  Pt had only been on copaxone for about 5 weeks at time of study as pt started med on Renate 3 2018  Pt to cont with med and call for any new sxs  Pt denies any change in bowel or bladder  No falls or trips  No change in speech or swallowing  No loc or sz  No change in vision  No diplopia  No ha or n or v   Will update all imaging as last cord studies about 10 months ago and need follow up after pt on med 6 months with updated mri head  Pt overall matt copaxone well  Pt notes occ welts at site of injection  No fever or chills  No recent infections or hospitalizations  Total time spent today 25 minutes   Greater than 50% of total time was spent with the patient and / or family counseling and / or coordination of care            The following portions of the patient's history were reviewed and updated as appropriate: allergies, current medications, past family history, past medical history, past social history, past surgical history and problem list          Objective:    Blood pressure 170/74, pulse 78, resp  rate 16, weight (!) 156 kg (344 lb 9 6 oz)  Physical Exam   Constitutional: She appears well-developed and well-nourished  Eyes: Pupils are equal, round, and reactive to light  Lids are normal    Cardiovascular: Intact distal pulses  Neurological: She has normal strength and normal reflexes  Coordination normal    Psychiatric: Her speech is normal        Neurological Exam  Mental Status  Awake, alert and oriented to person, place and time  Recent and remote memory are intact  Speech is normal  Language is fluent with no aphasia  Attention and concentration are normal  Fund of knowledge is appropriate for level of education  Cranial Nerves  CN II: Visual acuity is normal  Visual fields full to confrontation  CN III, IV, VI: Extraocular movements intact bilaterally  Normal lids and orbits bilaterally  Pupils equal round and reactive to light bilaterally  CN V: Facial sensation is normal   CN VII: Full and symmetric facial movement  CN VIII: Hearing is normal   CN IX, X: Palate elevates symmetrically  Normal gag reflex  CN XI: Shoulder shrug strength is normal   CN XII: Tongue midline without atrophy or fasciculations  Motor  Normal muscle bulk throughout  Normal muscle tone  No abnormal involuntary movements  Strength is 5/5 throughout all four extremities  Sensory  Dec vib abhijit lowers  Reflexes  Deep tendon reflexes are 2+ and symmetric in all four extremities with downgoing toes bilaterally  Coordination  Finger-to-nose, rapid alternating movements and heel-to-shin normal bilaterally without dysmetria  Gait  Normal casual, toe, heel and tandem gait  ROS:    Review of Systems   Constitutional: Negative  Negative for appetite change and fever  HENT: Positive for sinus pain and sinus pressure  Negative for hearing loss, tinnitus, trouble swallowing and voice change  Eyes: Negative  Negative for photophobia and pain  Respiratory: Negative  Negative for shortness of breath  Cardiovascular: Negative  Negative for palpitations  Gastrointestinal: Negative  Negative for nausea and vomiting  Endocrine: Negative  Negative for cold intolerance and heat intolerance  Genitourinary: Negative  Negative for dysuria, frequency and urgency  Musculoskeletal: Negative  Negative for myalgias and neck pain  Skin: Negative  Negative for rash  Granuloma annulare    Neurological: Negative  Negative for dizziness, tremors (twitching of left pinky/hand), seizures, syncope, facial asymmetry, speech difficulty, weakness, light-headedness, numbness and headaches  Hematological: Negative  Does not bruise/bleed easily  Psychiatric/Behavioral: Negative  Negative for confusion, hallucinations and sleep disturbance

## 2018-10-30 NOTE — ASSESSMENT & PLAN NOTE
Pt here for neuro follow up  Last seen in June 26 2018 by bony jenkins  Pt overall doing well since last visit  Pt had updated mri head on July 13 2018 with 2 new enh lesions seen in right hemisphere adj to the right occipital horn of the lv and right frontal lobe  No associated sxs  Rev imaging also at time of study  Pt had only been on copaxone for about 5 weeks at time of study as pt started med on Renate 3 2018  Pt to cont with med and call for any new sxs   Exam stable today  Will update all imaging as last cord studies about 10 months ago and need follow up after pt on med 6 months with updated mri head

## 2018-10-30 NOTE — PATIENT INSTRUCTIONS
Multiple sclerosis ([facility] [address] )  Pt here for neuro follow up  Last seen in June 26 2018 by bony jenkins  Pt overall doing well since last visit  Pt had updated mri head on July 13 2018 with 2 new enh lesions seen in right hemisphere adj to the right occipital horn of the lv and right frontal lobe  No associated sxs  Rev imaging also at time of study  Pt had only been on copaxone for about 5 weeks at time of study as pt started med on Renate 3 2018  Pt to cont with med and call for any new sxs   Exam stable today  Will update all imaging as last cord studies about 10 months ago and need follow up after pt on med 6 months with updated mri head

## 2018-12-04 ENCOUNTER — HOSPITAL ENCOUNTER (OUTPATIENT)
Dept: RADIOLOGY | Facility: HOSPITAL | Age: 38
Discharge: HOME | End: 2018-12-04
Attending: PSYCHIATRY & NEUROLOGY
Payer: COMMERCIAL

## 2018-12-04 DIAGNOSIS — G35 MULTIPLE SCLEROSIS (HCC): ICD-10-CM

## 2018-12-04 PROCEDURE — 72156 MRI NECK SPINE W/O & W/DYE: CPT

## 2018-12-04 PROCEDURE — A9585 GADOBUTROL INJECTION: HCPCS | Performed by: PSYCHIATRY & NEUROLOGY

## 2018-12-04 PROCEDURE — 70553 MRI BRAIN STEM W/O & W/DYE: CPT

## 2018-12-04 RX ADMIN — GADOBUTROL 15 ML: 604.72 INJECTION INTRAVENOUS at 21:25

## 2018-12-06 ENCOUNTER — TELEPHONE (OUTPATIENT)
Dept: NEUROLOGY | Facility: CLINIC | Age: 38
End: 2018-12-06

## 2018-12-06 ENCOUNTER — HOSPITAL ENCOUNTER (OUTPATIENT)
Dept: RADIOLOGY | Facility: HOSPITAL | Age: 38
Discharge: HOME | End: 2018-12-06
Attending: PSYCHIATRY & NEUROLOGY
Payer: COMMERCIAL

## 2018-12-06 DIAGNOSIS — G35 MULTIPLE SCLEROSIS (HCC): ICD-10-CM

## 2018-12-06 PROCEDURE — A9585 GADOBUTROL INJECTION: HCPCS | Performed by: PSYCHIATRY & NEUROLOGY

## 2018-12-06 PROCEDURE — 72157 MRI CHEST SPINE W/O & W/DYE: CPT

## 2018-12-06 RX ADMIN — GADOBUTROL 15 ML: 604.72 INJECTION INTRAVENOUS at 18:50

## 2018-12-06 NOTE — TELEPHONE ENCOUNTER
LMOM for patient to return call     ----- Message from Marcos Oneal MD sent at 12/6/2018  5:57 AM EST -----  Let pt know mri c spine with no new cord lesions comp to 12/31/17  However, pt with new left C6/7 disc protrusion with some mild central canal stnonsis  The disc contacts the ventral aspect of the left hemicord without flattening  Any new sxs ie pain in neck, pain on left arm or any change in bowel or bladder?   If any new sxs, we can refer to neurosurgery and pain mx

## 2018-12-06 NOTE — TELEPHONE ENCOUNTER
Made patient aware of below  Denies any pain or other symptoms  Does not feel she needs referrals at this time

## 2019-01-25 ENCOUNTER — OFFICE VISIT (OUTPATIENT)
Dept: NEUROLOGY | Facility: CLINIC | Age: 39
End: 2019-01-25
Payer: COMMERCIAL

## 2019-01-25 VITALS
HEART RATE: 85 BPM | SYSTOLIC BLOOD PRESSURE: 157 MMHG | DIASTOLIC BLOOD PRESSURE: 84 MMHG | HEIGHT: 64 IN | WEIGHT: 293 LBS | BODY MASS INDEX: 50.02 KG/M2

## 2019-01-25 DIAGNOSIS — R70.0 ELEVATED SED RATE: ICD-10-CM

## 2019-01-25 DIAGNOSIS — E55.9 VITAMIN D DEFICIENCY: ICD-10-CM

## 2019-01-25 DIAGNOSIS — G35 MULTIPLE SCLEROSIS (HCC): Primary | ICD-10-CM

## 2019-01-25 DIAGNOSIS — M25.50 MULTIPLE JOINT PAIN: ICD-10-CM

## 2019-01-25 PROCEDURE — 99214 OFFICE O/P EST MOD 30 MIN: CPT | Performed by: PHYSICIAN ASSISTANT

## 2019-01-25 RX ORDER — GLATIRAMER ACETATE 40 MG/ML
INJECTION, SOLUTION SUBCUTANEOUS
COMMUNITY
Start: 2019-01-01 | End: 2019-04-01 | Stop reason: SDUPTHER

## 2019-01-25 NOTE — PATIENT INSTRUCTIONS
Continue Copaxone  Will recheck labs including inflammatory factor and rheumatoid labs  Depending on labs, may want to contact rheumatology again  Neuro exam is stable  MRIs from Dec were all stable! Follow up in 4 months or sooner if needed  Call for any new or worsening symptoms

## 2019-01-25 NOTE — ASSESSMENT & PLAN NOTE
Patient reports increased generalized joint pain over the last month or so  In the past, she has seen rheumatology due to persistently elevated sed rate  This was felt to be nonspecific and no cause found  She was told to return to Rheumatology if any new or worsening symptoms  Will repeat some labs including sed rate, rheumatoid factor, ANDREA, Sjogren's    If any abnormalities, she will contact her rheumatologist

## 2019-01-25 NOTE — ASSESSMENT & PLAN NOTE
Patient remains overall stable from an MS-standpoint  She started Copaxone in early June 2018  In July 2018, MRI brain demonstrated 2 new, enhancing lesions, however she had only been on Copaxone for about 5 weeks at the time  Patient had updated imaging in December 2018  MRI brain 12/4/18 was stable  No new lesions or enhancement  MRI c-spine 12/4/18 and t-spine 12/6/18 were also stable from an MS standpoint, no lesions identified  MRI c-spine did demonstrate a new C6-7 left paracentral to foraminal zone disc protrusion  Mild central canal stenosis  No significant mass effect on the cord or nerve roots  She denies any neck pain  Labs last done in November 2018 with normal CBC and CMP  Her neuro exam is stable today  Will continue her on Copaxone  She will return in 4 months or sooner if needed  She will call for any new or worsening symptoms

## 2019-01-25 NOTE — PROGRESS NOTES
Patient ID: Kyra Hurt is a 41 y o  female  Assessment/Plan:    Multiple sclerosis ([facility] )  Patient remains overall stable from an MS-standpoint  She started Copaxone in early June 2018  In July 2018, MRI brain demonstrated 2 new, enhancing lesions, however she had only been on Copaxone for about 5 weeks at the time  Patient had updated imaging in December 2018  MRI brain 12/4/18 was stable  No new lesions or enhancement  MRI c-spine 12/4/18 and t-spine 12/6/18 were also stable from an MS standpoint, no lesions identified  MRI c-spine did demonstrate a new C6-7 left paracentral to foraminal zone disc protrusion  Mild central canal stenosis  No significant mass effect on the cord or nerve roots  She denies any neck pain  Labs last done in November 2018 with normal CBC and CMP  Her neuro exam is stable today  Will continue her on Copaxone  She will return in 4 months or sooner if needed  She will call for any new or worsening symptoms  Multiple joint pain  Patient reports increased generalized joint pain over the last month or so  In the past, she has seen rheumatology due to persistently elevated sed rate  This was felt to be nonspecific and no cause found  She was told to return to Rheumatology if any new or worsening symptoms  Will repeat some labs including sed rate, rheumatoid factor, ANDREA, Sjogren's  If any abnormalities, she will contact her rheumatologist        Diagnoses and all orders for this visit:    Multiple sclerosis ([facility] )  -     CBC and differential; Future  -     Comprehensive metabolic panel; Future    Elevated sed rate  -     Sedimentation rate, automated; Future    Multiple joint pain  -     ANDREA Screen w/ Reflex to Titer/Pattern; Future  -     RF Screen w/ Reflex to Titer; Future  -     Sjogren's Antibodies; Future    Vitamin D deficiency  -     Vitamin D 25 hydroxy;  Future    Other orders  -     COPAXONE 40 MG/ML SOSY;   -     sertraline (ZOLOFT) 50 mg tablet; Subjective:     HPI    Patient is a 81-year-old female with past medical history of hypertension for many years  Patient presented with onset of right visual field disturbance in early November 2017  At time of presentation, patient noted needing to move her head to see full picture on the right side to see her colleague who was sitting on the right side of her  MRI brain 11/24/17 with multiple supra and infratentorial lesions with varying degrees of enhancement and others nonenhancing  Lesions noted in the left cerebellar hemisphere measuring 9 mm with rim enhancement  Multiple supratentorial lesions and periventricular in location  Some of the periventricular lesions have a perpendicular orientation to the body of the lateral ventricle  The largest is in the right temporal parietal area all 11 mm with partial enhancement of the lesion  There is a 5 mm enhancing lesion within the occipital horn of the lateral ventricle  MRI cervical spine and MRI thoracic spine were completed in December 2017  No cord lesions found on either study  Patient had a lumbar puncture done as well  Negative Gram stain, negative Lyme PC R  MS panel with positive oligoclonal bands of 4 (4 oligoclonal bands in the CSF only, in addition for paired bands in both CSF and serum), elevated myelin basic protein 2 5 and elevated IgG synthesis rate of 5 3  Negative CSF NMO  Negative CSF ACE  Viral comprehensive PCR negative  Due to positive LP as well as multiple enhancing and nonenhancing lesions seen on MRI brain, diagnosis of MS was made  She was JCV negative with index 0 09  Patient chose to start Copaxone as her 1st IMD therapy  Patient had updated MRI brain on July 13 2018 with 2 new enhancing lesions seen in right hemisphere adjacent to the right occipital horn of the lateral ventricle and right frontal lobe  No associated symptoms   Patient had only been on Copaxone for about 5 weeks at time of study as patient started med on Renate 3 2018  She continued on the Copaxone  Patient last seen in October 2018  Today, patient reports she is overall doing well  Denies any new MS symptoms  She does not some increased joint pain (generalized) over the past month  She is not sure if it is from the weather, weight etc   She has had elevated sed rate in the past, non-specific, and was sent to rheumatology, who did not find anything at the time  She was told to come back if any new/worsening symptoms  She had updated imaging in Dec 2018  MRI brain 12/4/18 was stable  No new lesions or enhancement  MRI c-spine 12/4/18 and t-spine 12/6/18 were also stable from an MS standpoint, no lesions identified  MRI c-spine did demonstrate a new C6-7 left paracentral to foraminal zone disc protrusion  Mild central canal stenosis  No significant mass effect on the cord or nerve roots  She denies any neck pain  No issues with her Copaxone  No vision changes  The following portions of the patient's history were reviewed and updated as appropriate: current medications, past family history, past medical history, past social history, past surgical history and problem list          Objective:    Blood pressure 157/84, pulse 85, height 5' 4" (1 626 m), weight (!) 159 kg (350 lb)  Physical Exam   Constitutional: She appears well-developed and well-nourished  obese   HENT:   Head: Normocephalic and atraumatic  Eyes: Pupils are equal, round, and reactive to light  EOM are normal    Cardiovascular: Intact distal pulses  Neurological: She has normal strength and normal reflexes  Coordination normal    Skin: Skin is warm and dry  Psychiatric: She has a normal mood and affect  Her speech is normal        Neurological Exam  Mental Status   Oriented to person, place, time and situation  Recent and remote memory are intact  Speech is normal  Language is fluent with no aphasia   Attention and concentration are normal     Cranial Nerves  CN II: Visual fields full to confrontation  CN III, IV, VI: Extraocular movements intact bilaterally  Pupils equal round and reactive to light bilaterally  CN V: Facial sensation is normal   CN VII: Full and symmetric facial movement  CN VIII: Hearing is normal   CN IX, X: Palate elevates symmetrically  Normal gag reflex  CN XI: Shoulder shrug strength is normal   CN XII: Tongue midline without atrophy or fasciculations  Motor   Normal muscle tone  Strength is 5/5 throughout all four extremities  Sensory  Sensation is intact to light touch, pinprick, vibration and proprioception in all four extremities  Reflexes  Deep tendon reflexes are 2+ and symmetric in all four extremities with downgoing toes bilaterally  Difficult to elicit LE reflexes due to body habitus   Coordination  Finger-to-nose, rapid alternating movements and heel-to-shin normal bilaterally without dysmetria  Gait  Casual gait is normal including stance, stride, and arm swing  ROS:     Review of Systems   Constitutional: Negative  Negative for appetite change and fever  HENT: Negative  Negative for hearing loss, tinnitus, trouble swallowing and voice change  Eyes: Negative  Negative for photophobia and pain  Respiratory: Negative  Negative for shortness of breath  Cardiovascular: Negative  Negative for palpitations  Gastrointestinal: Negative  Negative for nausea and vomiting  Endocrine: Negative  Negative for cold intolerance and heat intolerance  Genitourinary: Negative  Negative for dysuria, frequency and urgency  Musculoskeletal: Negative for myalgias and neck pain  Joint pain   Skin: Negative  Negative for rash  Skin lesion   Neurological: Negative  Negative for dizziness, tremors, seizures, syncope, facial asymmetry, speech difficulty, weakness, light-headedness, numbness and headaches  Hematological: Negative  Does not bruise/bleed easily  Psychiatric/Behavioral: Negative  Negative for confusion, hallucinations and sleep disturbance       I personally reviewed and updated the ROS as appropriate

## 2019-04-01 DIAGNOSIS — G35 MULTIPLE SCLEROSIS (HCC): Primary | ICD-10-CM

## 2019-04-02 RX ORDER — GLATIRAMER ACETATE 40 MG/ML
40 INJECTION, SOLUTION SUBCUTANEOUS 3 TIMES WEEKLY
Qty: 12 SYRINGE | Refills: 6 | Status: SHIPPED | OUTPATIENT
Start: 2019-04-03 | End: 2019-10-31 | Stop reason: SDUPTHER

## 2019-04-02 RX ORDER — GLATIRAMER ACETATE 40 MG/ML
40 INJECTION, SOLUTION SUBCUTANEOUS 3 TIMES WEEKLY
Qty: 12 SYRINGE | Refills: 6 | Status: SHIPPED | OUTPATIENT
Start: 2019-04-03 | End: 2019-04-02 | Stop reason: SDUPTHER

## 2019-05-29 ENCOUNTER — OFFICE VISIT (OUTPATIENT)
Dept: NEUROLOGY | Facility: CLINIC | Age: 39
End: 2019-05-29
Payer: COMMERCIAL

## 2019-05-29 VITALS
HEIGHT: 64 IN | SYSTOLIC BLOOD PRESSURE: 157 MMHG | HEART RATE: 85 BPM | BODY MASS INDEX: 50.02 KG/M2 | WEIGHT: 293 LBS | DIASTOLIC BLOOD PRESSURE: 74 MMHG

## 2019-05-29 DIAGNOSIS — M77.8 LEFT ELBOW TENDONITIS: ICD-10-CM

## 2019-05-29 DIAGNOSIS — M79.644 PAIN OF RIGHT THUMB: ICD-10-CM

## 2019-05-29 DIAGNOSIS — M25.50 MULTIPLE JOINT PAIN: ICD-10-CM

## 2019-05-29 DIAGNOSIS — G35 MULTIPLE SCLEROSIS (HCC): Primary | ICD-10-CM

## 2019-05-29 PROCEDURE — 99214 OFFICE O/P EST MOD 30 MIN: CPT | Performed by: PHYSICIAN ASSISTANT

## 2019-07-12 ENCOUNTER — OFFICE VISIT (OUTPATIENT)
Dept: OBGYN CLINIC | Facility: HOSPITAL | Age: 39
End: 2019-07-12
Payer: COMMERCIAL

## 2019-07-12 VITALS
HEIGHT: 64 IN | SYSTOLIC BLOOD PRESSURE: 168 MMHG | BODY MASS INDEX: 50.02 KG/M2 | HEART RATE: 102 BPM | WEIGHT: 293 LBS | DIASTOLIC BLOOD PRESSURE: 91 MMHG

## 2019-07-12 DIAGNOSIS — M77.12 LATERAL EPICONDYLITIS OF LEFT ELBOW: ICD-10-CM

## 2019-07-12 DIAGNOSIS — M65.4 TENOSYNOVITIS, DE QUERVAIN: ICD-10-CM

## 2019-07-12 PROCEDURE — 99203 OFFICE O/P NEW LOW 30 MIN: CPT | Performed by: ORTHOPAEDIC SURGERY

## 2019-07-12 NOTE — PROGRESS NOTES
ASSESSMENT/PLAN:    Assessment:   Left lateral epicondylitis  Right deQuervain's     Plan:   Patient has already had 2 CS injection for the DeQuervain's unable to re-inject  Comfort Cool brace for activities  Patient will call if deQuervain's worsens and she would like to consider surgery  Follow Up:  PRN        General Discussions:     Marvin Mandel Tenosynovitis: The anatomy and physiology of de Quervain's tenosynovitis was discussed with the patient today in the office  Edema and increased contact pressure within the first dorsal extensor compartment at the radial styloid can cause pain, crepitation, and limitation of function  Treatment options include resting thumb spica splints to decrease edema, oral anti-inflammatory medications, home or formal therapy exercises, up to 2 steroid injections within the first dorsal extensor compartment, or surgical release  While majority of patients do respond to conservative treatment, up to 20% may require surgical release  Lateral Epicondylitis: The anatomy and physiology of lateral epicondylitis was discussed with the patient today  Typically, a traumatic injury or repetitive use may cause a partial or complete tear of the extensor carpi radialis brevis muscle  This creates pain over the lateral epicondyle  This pain typically is made worse with palm down lifting activities as well as anything that involves strength and stability of the wrist   The pain may radiate from the wrist up to the elbow  At times, the shoulder may be weak as well which can predispose or cause continuation of the problem  Conservative treatment usually cures a majority of patients; however, this may take up to 6-9 months  Conservative treatment options typically include activity modification, therapy for strengthening of the shoulder and elbow, nocturnal wrist support splints, tennis elbow straps, and possible corticosteroid injections    Corticosteroid injections do not change the natural history of this process, may decrease the pain temporarily, and may increase the risk of recurrence  Surgery is required in fewer than 10% of patients         _____________________________________________________  CHIEF COMPLAINT:  Chief Complaint   Patient presents with    Right Hand - Pain         SUBJECTIVE:  Jasmeet Le is a 46y o  year old female who presents with complaints of right thumb pain and left lateral elbow pain  She states she saw her PCP who gave her 2 previous deQuervains with minimal relief of her symptoms  She also complains of left lateral elbow pain which actually had improved since scheduling her appointment     Radiation: None  Previous Treatments: steroid injections with only partial relief  Associated symptoms: Pain  Mild  Intermittant  Dull and Aching    pcp 2 injections dequervain, left lateral elbow pain  -n/t    PAST MEDICAL HISTORY:  Past Medical History:   Diagnosis Date    Anxiety     Asthma     Hypertension     Murmur, heart     Seasonal allergies     Sinus problem     Tonsillitis        PAST SURGICAL HISTORY:  Past Surgical History:   Procedure Laterality Date    CHOLECYSTECTOMY         FAMILY HISTORY:  Family History   Problem Relation Age of Onset    Asthma Mother     COPD Mother     Eczema Mother     Emphysema Mother     Heart disease Father     Hypertension Father     Asthma Father     Alcohol abuse Father     Heart disease Paternal Aunt     Heart disease Paternal Uncle     Cancer Maternal Grandfather     Glaucoma Maternal Grandfather     Asthma Maternal Grandfather     Emphysema Maternal Grandfather     Cancer Paternal Grandfather     Hypertension Paternal Grandfather     Hypertension Maternal Grandmother     Emphysema Maternal Grandmother     Asthma Maternal Grandmother        SOCIAL HISTORY:  Social History     Tobacco Use    Smoking status: Never Smoker    Smokeless tobacco: Never Used    Tobacco comment: no passive smoke exposure   Substance Use Topics    Alcohol use: Yes     Comment: rarely    Drug use: No       MEDICATIONS:    Current Outpatient Medications:     ALBUTEROL IN, Inhale 2 puffs as needed, Disp: , Rfl:     cetirizine (ZyrTEC) 10 mg tablet, TAKE 1 TABLET BY ORAL ROUTE EVERY DAY, Disp: , Rfl:     COPAXONE 40 MG/ML SOSY, Inject 1 mL (40 mg total) under the skin 3 (three) times a week, Disp: 12 Syringe, Rfl: 6    diltiazem (CARDIZEM SR) 90 mg 12 hr capsule, Take 90 mg by mouth daily, Disp: , Rfl:     fluticasone (FLONASE) 50 mcg/act nasal spray, INHALE 2 SPRAY BY INTRANASAL ROUTE EVERY DAY IN EACH NOSTRIL, Disp: , Rfl: 0    hydrochlorothiazide (MICROZIDE) 12 5 mg capsule, Take 12 5 mg by mouth daily, Disp: , Rfl:     KLOR-CON M10 10 MEQ tablet, , Disp: , Rfl:     potassium chloride (MICRO-K) 10 MEQ CR capsule, Take 10 mEq by mouth daily Taking two tabs, Disp: , Rfl:     sertraline (ZOLOFT) 50 mg tablet, , Disp: , Rfl:     naproxen sodium (ANAPROX) 550 mg tablet, Take 550 mg by mouth, Disp: , Rfl:     ALLERGIES:  No Known Allergies    REVIEW OF SYSTEMS:  Pertinent items are noted in HPI  A comprehensive review of systems was negative      LABS:  HgA1c: No results found for: HGBA1C  BMP:   Lab Results   Component Value Date    CALCIUM 9 1 03/16/2018    K 3 5 03/16/2018    CO2 28 03/16/2018     03/16/2018    BUN 12 03/16/2018    CREATININE 0 59 (A) 03/16/2018         _____________________________________________________  PHYSICAL EXAMINATION:  /91   Pulse 102   Ht 5' 4" (1 626 m)   Wt (!) 158 kg (349 lb)   BMI 59 91 kg/m²   General: well developed and well nourished, alert, oriented times 3 and appears comfortable  Psychiatric: Normal  HEENT: Trachea Midline, No torticollis  Cardiovascular: No discernable arrhythmia  Pulmonary: No wheezing or stridor  Skin: No masses, erthema, lacerations, fluctation, ulcerations  Neurovascular: Sensation Intact to the Median, Ulnar, Radial Nerve, Motor Intact to the Median, Ulnar, Radial Nerve and Pulses Intact    MUSCULOSKELETAL EXAMINATION:  Left elbow  Full ROM  No crepitation  No instability   Non TTPradial tuberosity, radial tunnel,  + TTP lateral epicondyle  Pain with resisted wrist extension     Right thumb  +finklestein  +TTP over the first dorsal compartment    _____________________________________________________  STUDIES REVIEWED:  No Studies to review      PROCEDURES PERFORMED:  Procedures  No Procedures performed today      Scribe Attestation    I,:   Garrett Lombardo am acting as a scribe while in the presence of the attending physician :        I,:   Maxim Quiroz MD personally performed the services described in this documentation    as scribed in my presence :

## 2019-07-12 NOTE — PATIENT INSTRUCTIONS
What is Binta Ast Syndrome? Patients with de Quervain syndrome have painful tendons on the thumb side of the wrist  Tendons are the ropes that the muscle uses to pull the bone  You can see them on the back of your hand when you straighten your fingers  In  de Quervain syndrome, the tunnel (the first extensor compartment; see Figure 1A-B) where the tendons run narrows due to the thickening of the soft tissues that make up the tunnel  Hand and thumb motion cause pain, especially with forceful grasping or twisting  Causes  Doctors are not sure what causes de Quervain syndrome  Patients often describe a feeling of inflammation, but studies have shown that the process is not inflammatory  People of all ages get it  When new mothers develop  de Quervain syndrome, it typically appears 4 to 6 weeks after delivery  The old theory that it was caused by wringing out cloth diapers has been replaced by concerns about holding the baby, but changes in hormones and swelling seem more probable  Treatment  Treatments that can relieve symptoms include:   A splint that stops you from moving your thumb and wrist    Tylenol or aspirin type medications (e g , ibuprofen)  Treatments that attempt to change the course of the disease include:   A cortisone-type of steroid injection into the tendon compartment  It has not been clearly established that    these injections change the course of the disease and response to the injection varies   Surgery to open the tunnel and make more room for the tendons  © 2012 [employer]  www handcare  org

## 2019-09-09 ENCOUNTER — OFFICE VISIT (OUTPATIENT)
Dept: OBGYN CLINIC | Facility: CLINIC | Age: 39
End: 2019-09-09
Payer: COMMERCIAL

## 2019-09-09 VITALS
HEIGHT: 64 IN | WEIGHT: 293 LBS | DIASTOLIC BLOOD PRESSURE: 76 MMHG | BODY MASS INDEX: 50.02 KG/M2 | SYSTOLIC BLOOD PRESSURE: 138 MMHG

## 2019-09-09 DIAGNOSIS — M65.4 DE QUERVAIN'S TENOSYNOVITIS, RIGHT: Primary | ICD-10-CM

## 2019-09-09 PROCEDURE — 99214 OFFICE O/P EST MOD 30 MIN: CPT | Performed by: ORTHOPAEDIC SURGERY

## 2019-09-09 RX ORDER — LIDOCAINE HYDROCHLORIDE AND EPINEPHRINE 10; 10 MG/ML; UG/ML
20 INJECTION, SOLUTION INFILTRATION; PERINEURAL ONCE
Status: CANCELLED | OUTPATIENT
Start: 2019-09-09 | End: 2019-09-09

## 2019-09-09 NOTE — H&P
ASSESSMENT/PLAN:    Assessment:   Right wrist de Quervain tenosynovitis    Plan:   Diagnosis, treatment options and associated risks were discussed with the patient including no treatment, nonsurgical treatment and potential for surgical intervention  The patient was given the opportunity to ask questions regarding each  Patient has failed conservative options  Quality of life to pursue surgical intervention to include right de Quervain release  Risks and benefits of surgery discussed  Consents obtained  Follow Up: After Surgery    To Do Next Visit:   post-op evaluation      Operative Discussions:     Timoteo Post Release: The anatomy and physiology of de Quervain's tenosynovitis was discussed with the patient today in the office  Edema and increased contact pressure within the first dorsal extensor compartment at the radial styloid can cause pain, crepitation, and limitation of function  Treatment options include resting thumb spica splints to decrease edema, oral anti-inflammatory medications, home or formal therapy exercises, up to 2 steroid injections within the first dorsal extensor compartment, or surgical release  While majority of patients do respond to conservative treatment, up to 20% may require surgical release  The patient has elected to undergo a release of the first dorsal extensor compartment (de Quervain's)  A small incision will be made over the radial styloid of the wrist, the tendon sheath holding the abductor pollicis longus and extensor pollicis brevis will be released  In the postoperative period, light activities are allowed immediately, driving is allowed when narcotic medication has stopped, and the incision may get wet after 2 days  Heavy activities (lifting more than approximately 10 pounds) will be allowed after the follow up appointment in 1-2 weeks    While the pain and discomfort within the wrist typically improves rapidly, some residual discomfort may be present for up to 6 weeks  The patient may notice temporary numbness within the superficial sensory branch of the radial nerve secondary to a traction neuropraxia  This is often a self-limiting condition  The patient has an understanding of the above mentioned discussion  Approximate success rate is 98%  The risks and benefits of the procedure were explained to the patient, which include, but are not limited to: Bleeding, infection, recurrence, pain, scar, damage to tendons, damage to nerves, and damage to blood vessels, failure to give desired results and complications related to anesthesia   These risks, along with alternative conservative treatment options, and postoperative protocols were voiced back and understood by the patient   All questions were answered to the patient's satisfaction   The patient agrees to comply with a standard postoperative protocol, and is willing to proceed   Education was provided via written and auditory forms   There were no barriers to learning  Written handouts regarding wound care, incision and scar care, and general preoperative information was provided to the patient   Prior to surgery, the patient may be requested to stop all anti-inflammatory medications   Prophylactic aspirin, Plavix, and Coumadin may be allowed to be continued   Medications including vitamin E , ginkgo, and fish oil are requested to be stopped approximately one week prior to surgery   Hypertensive medications and beta blockers, if taken, should be continued  Right wrist de Quervain release    _____________________________________________________  CHIEF COMPLAINT:  Chief Complaint   Patient presents with    Right Thumb - Follow-up         SUBJECTIVE:  Lisa Hebert is a 46 y o  female who presents for follow up regarding de Quervain stenosis tenosynovitis  right    Since last visit, Lisa Hebert has tried Resume activities as tolerated, steroid injections x's 2, NSAIDs, activity modification, bracing and ice with only partial relief  Today there is pain to the right wrist and extending into the base of her thumb      Radiation: None  Associated symptoms: None    PAST MEDICAL HISTORY:  Past Medical History:   Diagnosis Date    Anxiety     Asthma     Hypertension     Murmur, heart     Seasonal allergies     Sinus problem     Tonsillitis        PAST SURGICAL HISTORY:  Past Surgical History:   Procedure Laterality Date    CHOLECYSTECTOMY         FAMILY HISTORY:  Family History   Problem Relation Age of Onset    Asthma Mother     COPD Mother     Eczema Mother     Emphysema Mother     Heart disease Father     Hypertension Father     Asthma Father     Alcohol abuse Father     Heart disease Paternal Aunt     Heart disease Paternal Uncle     Cancer Maternal Grandfather     Glaucoma Maternal Grandfather     Asthma Maternal Grandfather     Emphysema Maternal Grandfather     Cancer Paternal Grandfather     Hypertension Paternal Grandfather     Hypertension Maternal Grandmother     Emphysema Maternal Grandmother     Asthma Maternal Grandmother        SOCIAL HISTORY:  Social History     Tobacco Use    Smoking status: Never Smoker    Smokeless tobacco: Never Used    Tobacco comment: no passive smoke exposure   Substance Use Topics    Alcohol use: Yes     Comment: rarely    Drug use: No       MEDICATIONS:    Current Outpatient Medications:     ALBUTEROL IN, Inhale 2 puffs as needed, Disp: , Rfl:     cetirizine (ZyrTEC) 10 mg tablet, TAKE 1 TABLET BY ORAL ROUTE EVERY DAY, Disp: , Rfl:     COPAXONE 40 MG/ML SOSY, Inject 1 mL (40 mg total) under the skin 3 (three) times a week, Disp: 12 Syringe, Rfl: 6    fluticasone (FLONASE) 50 mcg/act nasal spray, INHALE 2 SPRAY BY INTRANASAL ROUTE EVERY DAY IN EACH NOSTRIL, Disp: , Rfl: 0    diltiazem (CARDIZEM SR) 90 mg 12 hr capsule, Take 90 mg by mouth daily, Disp: , Rfl:     hydrochlorothiazide (MICROZIDE) 12 5 mg capsule, Take 12 5 mg by mouth daily, Disp: , Rfl:   KLOR-CON M10 10 MEQ tablet, , Disp: , Rfl:     naproxen sodium (ANAPROX) 550 mg tablet, Take 550 mg by mouth, Disp: , Rfl:     potassium chloride (MICRO-K) 10 MEQ CR capsule, Take 10 mEq by mouth daily Taking two tabs, Disp: , Rfl:     sertraline (ZOLOFT) 50 mg tablet, , Disp: , Rfl:     ALLERGIES:  No Known Allergies    REVIEW OF SYSTEMS:  Pertinent items are noted in HPI  A comprehensive review of systems was negative      LABS:  HgA1c: No results found for: HGBA1C  BMP:   Lab Results   Component Value Date    CALCIUM 9 1 03/16/2018    K 3 5 03/16/2018    CO2 28 03/16/2018     03/16/2018    BUN 12 03/16/2018    CREATININE 0 59 (A) 03/16/2018           _____________________________________________________  PHYSICAL EXAMINATION:  Vital signs: /76   Ht 5' 4" (1 626 m)   Wt (!) 158 kg (348 lb 9 6 oz)   BMI 59 84 kg/m²    General: well developed and well nourished, alert, oriented times 3 and appears comfortable  Psychiatric: Normal  HEENT: Trachea Midline, No torticollis  Cardiovascular: No discernable arrhythmia  Pulmonary: No wheezing or stridor  Skin: No masses, erthema, lacerations, fluctation, ulcerations  Neurovascular: Sensation Intact to the Median, Ulnar, Radial Nerve, Motor Intact to the Median, Ulnar, Radial Nerve and Pulses Intact    MUSCULOSKELETAL EXAMINATION:  RIGHT SIDE:  De Quervain:  Tenderness Radial Styloid and Positive Finkelstein's Test    _____________________________________________________  STUDIES REVIEWED:  No Studies to review      PROCEDURES PERFORMED:  Procedures  No Procedures performed today   Scribe Attestation    I,:   Shadia Weber am acting as a scribe while in the presence of the attending physician :        I,:   Mary Shipley MD personally performed the services described in this documentation    as scribed in my presence :

## 2019-09-09 NOTE — PROGRESS NOTES
ASSESSMENT/PLAN:    Assessment:   Right wrist de Quervain tenosynovitis    Plan:   Diagnosis, treatment options and associated risks were discussed with the patient including no treatment, nonsurgical treatment and potential for surgical intervention  The patient was given the opportunity to ask questions regarding each  Patient has failed conservative options  Quality of life to pursue surgical intervention to include right de Quervain release  Risks and benefits of surgery discussed  Consents obtained  Follow Up: After Surgery    To Do Next Visit:   post-op evaluation      Operative Discussions:     Timoteo Post Release: The anatomy and physiology of de Quervain's tenosynovitis was discussed with the patient today in the office  Edema and increased contact pressure within the first dorsal extensor compartment at the radial styloid can cause pain, crepitation, and limitation of function  Treatment options include resting thumb spica splints to decrease edema, oral anti-inflammatory medications, home or formal therapy exercises, up to 2 steroid injections within the first dorsal extensor compartment, or surgical release  While majority of patients do respond to conservative treatment, up to 20% may require surgical release  The patient has elected to undergo a release of the first dorsal extensor compartment (de Quervain's)  A small incision will be made over the radial styloid of the wrist, the tendon sheath holding the abductor pollicis longus and extensor pollicis brevis will be released  In the postoperative period, light activities are allowed immediately, driving is allowed when narcotic medication has stopped, and the incision may get wet after 2 days  Heavy activities (lifting more than approximately 10 pounds) will be allowed after the follow up appointment in 1-2 weeks    While the pain and discomfort within the wrist typically improves rapidly, some residual discomfort may be present for up to 6 weeks  The patient may notice temporary numbness within the superficial sensory branch of the radial nerve secondary to a traction neuropraxia  This is often a self-limiting condition  The patient has an understanding of the above mentioned discussion  Approximate success rate is 98%  The risks and benefits of the procedure were explained to the patient, which include, but are not limited to: Bleeding, infection, recurrence, pain, scar, damage to tendons, damage to nerves, and damage to blood vessels, failure to give desired results and complications related to anesthesia   These risks, along with alternative conservative treatment options, and postoperative protocols were voiced back and understood by the patient   All questions were answered to the patient's satisfaction   The patient agrees to comply with a standard postoperative protocol, and is willing to proceed   Education was provided via written and auditory forms   There were no barriers to learning  Written handouts regarding wound care, incision and scar care, and general preoperative information was provided to the patient   Prior to surgery, the patient may be requested to stop all anti-inflammatory medications   Prophylactic aspirin, Plavix, and Coumadin may be allowed to be continued   Medications including vitamin E , ginkgo, and fish oil are requested to be stopped approximately one week prior to surgery   Hypertensive medications and beta blockers, if taken, should be continued  Right wrist de Quervain release    _____________________________________________________  CHIEF COMPLAINT:  Chief Complaint   Patient presents with    Right Thumb - Follow-up         SUBJECTIVE:  Annalisa Noble is a 46 y o  female who presents for follow up regarding de Quervain stenosis tenosynovitis  right    Since last visit, Annalisa Noble has tried Resume activities as tolerated, steroid injections x's 2, NSAIDs, activity modification, bracing and ice with only partial relief  Today there is pain to the right wrist and extending into the base of her thumb      Radiation: None  Associated symptoms: None    PAST MEDICAL HISTORY:  Past Medical History:   Diagnosis Date    Anxiety     Asthma     Hypertension     Murmur, heart     Seasonal allergies     Sinus problem     Tonsillitis        PAST SURGICAL HISTORY:  Past Surgical History:   Procedure Laterality Date    CHOLECYSTECTOMY         FAMILY HISTORY:  Family History   Problem Relation Age of Onset    Asthma Mother     COPD Mother     Eczema Mother     Emphysema Mother     Heart disease Father     Hypertension Father     Asthma Father     Alcohol abuse Father     Heart disease Paternal Aunt     Heart disease Paternal Uncle     Cancer Maternal Grandfather     Glaucoma Maternal Grandfather     Asthma Maternal Grandfather     Emphysema Maternal Grandfather     Cancer Paternal Grandfather     Hypertension Paternal Grandfather     Hypertension Maternal Grandmother     Emphysema Maternal Grandmother     Asthma Maternal Grandmother        SOCIAL HISTORY:  Social History     Tobacco Use    Smoking status: Never Smoker    Smokeless tobacco: Never Used    Tobacco comment: no passive smoke exposure   Substance Use Topics    Alcohol use: Yes     Comment: rarely    Drug use: No       MEDICATIONS:    Current Outpatient Medications:     ALBUTEROL IN, Inhale 2 puffs as needed, Disp: , Rfl:     cetirizine (ZyrTEC) 10 mg tablet, TAKE 1 TABLET BY ORAL ROUTE EVERY DAY, Disp: , Rfl:     COPAXONE 40 MG/ML SOSY, Inject 1 mL (40 mg total) under the skin 3 (three) times a week, Disp: 12 Syringe, Rfl: 6    fluticasone (FLONASE) 50 mcg/act nasal spray, INHALE 2 SPRAY BY INTRANASAL ROUTE EVERY DAY IN EACH NOSTRIL, Disp: , Rfl: 0    diltiazem (CARDIZEM SR) 90 mg 12 hr capsule, Take 90 mg by mouth daily, Disp: , Rfl:     hydrochlorothiazide (MICROZIDE) 12 5 mg capsule, Take 12 5 mg by mouth daily, Disp: , Rfl:   KLOR-CON M10 10 MEQ tablet, , Disp: , Rfl:     naproxen sodium (ANAPROX) 550 mg tablet, Take 550 mg by mouth, Disp: , Rfl:     potassium chloride (MICRO-K) 10 MEQ CR capsule, Take 10 mEq by mouth daily Taking two tabs, Disp: , Rfl:     sertraline (ZOLOFT) 50 mg tablet, , Disp: , Rfl:     ALLERGIES:  No Known Allergies    REVIEW OF SYSTEMS:  Pertinent items are noted in HPI  A comprehensive review of systems was negative      LABS:  HgA1c: No results found for: HGBA1C  BMP:   Lab Results   Component Value Date    CALCIUM 9 1 03/16/2018    K 3 5 03/16/2018    CO2 28 03/16/2018     03/16/2018    BUN 12 03/16/2018    CREATININE 0 59 (A) 03/16/2018           _____________________________________________________  PHYSICAL EXAMINATION:  Vital signs: /76   Ht 5' 4" (1 626 m)   Wt (!) 158 kg (348 lb 9 6 oz)   BMI 59 84 kg/m²   General: well developed and well nourished, alert, oriented times 3 and appears comfortable  Psychiatric: Normal  HEENT: Trachea Midline, No torticollis  Cardiovascular: No discernable arrhythmia  Pulmonary: No wheezing or stridor  Skin: No masses, erthema, lacerations, fluctation, ulcerations  Neurovascular: Sensation Intact to the Median, Ulnar, Radial Nerve, Motor Intact to the Median, Ulnar, Radial Nerve and Pulses Intact    MUSCULOSKELETAL EXAMINATION:  RIGHT SIDE:  De Quervain:  Tenderness Radial Styloid and Positive Finkelstein's Test    _____________________________________________________  STUDIES REVIEWED:  No Studies to review      PROCEDURES PERFORMED:  Procedures  No Procedures performed today   Scribe Attestation    I,:   Belgica Collins am acting as a scribe while in the presence of the attending physician :        I,:   Beto Okeefe MD personally performed the services described in this documentation    as scribed in my presence :

## 2019-09-09 NOTE — PATIENT INSTRUCTIONS
What is Gerson Galindo Syndrome? Patients with de Quervain syndrome have painful tendons on the thumb side of the wrist  Tendons are the ropes that the muscle uses to pull the bone  You can see them on the back of your hand when you straighten your fingers  In  de Quervain syndrome, the tunnel (the first extensor compartment; see Figure 1A-B) where the tendons run narrows due to the thickening of the soft tissues that make up the tunnel  Hand and thumb motion cause pain, especially with forceful grasping or twisting  Causes  Doctors are not sure what causes de Quervain syndrome  Patients often describe a feeling of inflammation, but studies have shown that the process is not inflammatory  People of all ages get it  When new mothers develop  de Quervain syndrome, it typically appears 4 to 6 weeks after delivery  The old theory that it was caused by wringing out cloth diapers has been replaced by concerns about holding the baby, but changes in hormones and swelling seem more probable  Treatment  Treatments that can relieve symptoms include:   A splint that stops you from moving your thumb and wrist    Tylenol or aspirin type medications (e g , ibuprofen)  Treatments that attempt to change the course of the disease include:   A cortisone-type of steroid injection into the tendon compartment  It has not been clearly established that    these injections change the course of the disease and response to the injection varies   Surgery to open the tunnel and make more room for the tendons  © 2012 [employer]  www handcare  org

## 2019-09-10 PROBLEM — M65.4 DE QUERVAIN'S TENOSYNOVITIS, RIGHT: Status: ACTIVE | Noted: 2019-09-10

## 2019-10-09 RX ORDER — LORATADINE 10 MG/1
10 TABLET ORAL DAILY
COMMUNITY

## 2019-10-09 NOTE — PRE-PROCEDURE INSTRUCTIONS
Pre-Surgery Instructions:   Medication Instructions    ALBUTEROL IN Instructed patient per Anesthesia Guidelines   COPAXONE 40 MG/ML SOSY Patient was instructed to contact Physician for medication instruction   fluticasone (FLONASE) 50 mcg/act nasal spray Instructed patient per Anesthesia Guidelines   loratadine (CLARITIN) 10 mg tablet Instructed patient per Anesthesia Guidelines   naproxen sodium (ANAPROX) 550 mg tablet Instructed patient per Anesthesia Guidelines   NON FORMULARY Instructed patient per Anesthesia Guidelines  Before your operation, you play an important role in decreasing your risk for infection by washing with special antiseptic soap  This is an effective way to reduce bacteria on the skin which may help to prevent infections at the surgical site  Please read the following directions in advance  1  In the week before your operation purchase a 4 ounce bottle of antiseptic soap containing chlorhexidine gluconate 4%  Some brand names include: Aplicare, Endure, and Hibiclens  The cost is usually less than $5 00  · For your convenience, the [address] [place] [address] carries the soap  · It may also be available at your doctor's office or pre-admission testing center, and at most retail pharmacies  · If you are allergic or sensitive to soaps containing chlorhexidine gluconate (CHG), please let your doctor know so another antiseptic soap can be suggested  · CHG antiseptic soap is for external use only  2      The day before your operation follow these directions carefully to get ready  · Place clean lines (sheets) on your bed; you should sleep on clean sheets after your evening shower  · Get clean towels and washcloths ready - you need enough for 2 showers  · Set aside clean underwear, pajamas, and clothing to wear after the shower  Reminders:  · DO NOT use any other soap or body rinse on your skin during or after the antiseptic showers    · DO NOT use lotion , powder, deodorant, or perfume/aftershave of any kind on your skin after your antiseptic shower  · DO NOT shave any body parts in the 24 hours/the day before your operation  · DO NOT get the antiseptic soap in your eyes, ears, nose, mouth, or vaginal area  3      You will need to shower the night before AND the morning of your Surgery  Shower 1:  · The evening before your operation, take the fist shower  · First, shampoo your hair with regular shampoo and rinse it completely before you use the anitseptic soap  After washing and rinsing your hair, rinse your body  · Next, use a clean wash cloth to apply the antiseptic soap and wash your body from the neck down to your toes using 1/2 bottle of the antiseptic soap  You will use the other 1/2 bottle for the second shower  · Clean the area where your incision will be; later this area well for about 2 minutes  · If you ar having head or neck surgery, wash areas with the antiseptic soap  · Rinse yourself completely with running water  · Use a clean towel to dry off  · Wear clean underwear and clothing/pajamas  Shower 2:  · The Morning of your operation, take the second shower following the same steps as Shower 1 using the second 1/2 of the bottle of antiseptic soap  · Use clean cloths and towels to was and dry yourself off  · Wear clean underwear and clothing

## 2019-10-10 ENCOUNTER — TELEPHONE (OUTPATIENT)
Dept: NEUROLOGY | Facility: CLINIC | Age: 39
End: 2019-10-10

## 2019-10-10 NOTE — TELEPHONE ENCOUNTER
Pt left voicemail stating that she is going to be having hand surgery to fix a tendon and will be under an on 10/17  Pt is due to take copaxone that morning and was told by surgeon wo check with you to make sure it is okay she has her injections that day  Pt also making sure she can have the surgery with her MS diagnosis  Please advise

## 2019-10-11 NOTE — TELEPHONE ENCOUNTER
Called patient  Read her Vanita's message  She verbalizes understanding  She does not have any additional questions or concerns at this time

## 2019-10-11 NOTE — TELEPHONE ENCOUNTER
There is no contraindication for MS patients having surgery in general   Surgeon may require medical clearance from PCP    No issue with taking her Copaxone injections as scheduled on day of surgery

## 2019-10-17 ENCOUNTER — ANESTHESIA EVENT (OUTPATIENT)
Dept: PERIOP | Facility: HOSPITAL | Age: 39
End: 2019-10-17
Payer: COMMERCIAL

## 2019-10-17 ENCOUNTER — HOSPITAL ENCOUNTER (OUTPATIENT)
Facility: HOSPITAL | Age: 39
Setting detail: OUTPATIENT SURGERY
Discharge: HOME | End: 2019-10-17
Attending: ORTHOPAEDIC SURGERY | Admitting: ORTHOPAEDIC SURGERY
Payer: COMMERCIAL

## 2019-10-17 ENCOUNTER — ANESTHESIA (OUTPATIENT)
Dept: PERIOP | Facility: HOSPITAL | Age: 39
End: 2019-10-17
Payer: COMMERCIAL

## 2019-10-17 VITALS
BODY MASS INDEX: 50.02 KG/M2 | OXYGEN SATURATION: 99 % | TEMPERATURE: 98.8 F | SYSTOLIC BLOOD PRESSURE: 174 MMHG | DIASTOLIC BLOOD PRESSURE: 89 MMHG | HEART RATE: 73 BPM | HEIGHT: 64 IN | RESPIRATION RATE: 20 BRPM | WEIGHT: 293 LBS

## 2019-10-17 DIAGNOSIS — M65.4 DE QUERVAIN'S TENOSYNOVITIS, RIGHT: Primary | ICD-10-CM

## 2019-10-17 LAB
EXT PREGNANCY TEST URINE: NEGATIVE
EXT. CONTROL: NORMAL

## 2019-10-17 PROCEDURE — 25000 INCISION OF TENDON SHEATH: CPT | Performed by: ORTHOPAEDIC SURGERY

## 2019-10-17 PROCEDURE — 99024 POSTOP FOLLOW-UP VISIT: CPT | Performed by: ORTHOPAEDIC SURGERY

## 2019-10-17 PROCEDURE — 25000 INCISION OF TENDON SHEATH: CPT | Performed by: PHYSICIAN ASSISTANT

## 2019-10-17 PROCEDURE — 81025 URINE PREGNANCY TEST: CPT | Performed by: ANESTHESIOLOGY

## 2019-10-17 RX ORDER — FENTANYL CITRATE/PF 50 MCG/ML
25 SYRINGE (ML) INJECTION
Status: CANCELLED | OUTPATIENT
Start: 2019-10-17

## 2019-10-17 RX ORDER — NAPROXEN SODIUM 220 MG
220 TABLET ORAL 2 TIMES DAILY WITH MEALS
Qty: 10 TABLET | Refills: 0 | Status: SHIPPED | OUTPATIENT
Start: 2019-10-17 | End: 2020-02-27

## 2019-10-17 RX ORDER — LIDOCAINE HYDROCHLORIDE AND EPINEPHRINE 10; 10 MG/ML; UG/ML
INJECTION, SOLUTION INFILTRATION; PERINEURAL AS NEEDED
Status: DISCONTINUED | OUTPATIENT
Start: 2019-10-17 | End: 2019-10-17 | Stop reason: HOSPADM

## 2019-10-17 RX ORDER — SENNOSIDES 8.6 MG
650 CAPSULE ORAL EVERY 8 HOURS
Qty: 15 TABLET | Refills: 0 | Status: SHIPPED | OUTPATIENT
Start: 2019-10-17 | End: 2020-02-27

## 2019-10-17 RX ORDER — SODIUM CHLORIDE, SODIUM LACTATE, POTASSIUM CHLORIDE, CALCIUM CHLORIDE 600; 310; 30; 20 MG/100ML; MG/100ML; MG/100ML; MG/100ML
75 INJECTION, SOLUTION INTRAVENOUS CONTINUOUS
Status: DISCONTINUED | OUTPATIENT
Start: 2019-10-17 | End: 2019-10-17 | Stop reason: HOSPADM

## 2019-10-17 RX ORDER — MIDAZOLAM HYDROCHLORIDE 1 MG/ML
INJECTION INTRAMUSCULAR; INTRAVENOUS AS NEEDED
Status: DISCONTINUED | OUTPATIENT
Start: 2019-10-17 | End: 2019-10-17 | Stop reason: SURG

## 2019-10-17 RX ORDER — CEFAZOLIN SODIUM 2 G/50ML
2000 SOLUTION INTRAVENOUS ONCE
Status: COMPLETED | OUTPATIENT
Start: 2019-10-17 | End: 2019-10-17

## 2019-10-17 RX ORDER — HYDROMORPHONE HCL/PF 1 MG/ML
0.5 SYRINGE (ML) INJECTION
Status: CANCELLED | OUTPATIENT
Start: 2019-10-17

## 2019-10-17 RX ORDER — CEFAZOLIN SODIUM 1 G/50ML
1000 SOLUTION INTRAVENOUS ONCE
Status: COMPLETED | OUTPATIENT
Start: 2019-10-17 | End: 2019-10-17

## 2019-10-17 RX ORDER — ACETAMINOPHEN 325 MG/1
650 TABLET ORAL ONCE
Status: DISCONTINUED | OUTPATIENT
Start: 2019-10-17 | End: 2019-10-17 | Stop reason: HOSPADM

## 2019-10-17 RX ORDER — HYDROCODONE BITARTRATE AND ACETAMINOPHEN 5; 325 MG/1; MG/1
1 TABLET ORAL EVERY 6 HOURS PRN
Qty: 5 TABLET | Refills: 0 | Status: SHIPPED | OUTPATIENT
Start: 2019-10-17 | End: 2019-10-22

## 2019-10-17 RX ORDER — DIPHENHYDRAMINE HYDROCHLORIDE 50 MG/ML
12.5 INJECTION INTRAMUSCULAR; INTRAVENOUS ONCE AS NEEDED
Status: CANCELLED | OUTPATIENT
Start: 2019-10-17

## 2019-10-17 RX ORDER — ONDANSETRON 2 MG/ML
4 INJECTION INTRAMUSCULAR; INTRAVENOUS ONCE AS NEEDED
Status: CANCELLED | OUTPATIENT
Start: 2019-10-17

## 2019-10-17 RX ORDER — PROPOFOL 10 MG/ML
INJECTION, EMULSION INTRAVENOUS AS NEEDED
Status: DISCONTINUED | OUTPATIENT
Start: 2019-10-17 | End: 2019-10-17 | Stop reason: SURG

## 2019-10-17 RX ORDER — HYDROCODONE BITARTRATE AND ACETAMINOPHEN 5; 325 MG/1; MG/1
1 TABLET ORAL EVERY 6 HOURS PRN
Status: DISCONTINUED | OUTPATIENT
Start: 2019-10-17 | End: 2019-10-17 | Stop reason: HOSPADM

## 2019-10-17 RX ORDER — FENTANYL CITRATE 50 UG/ML
INJECTION, SOLUTION INTRAMUSCULAR; INTRAVENOUS AS NEEDED
Status: DISCONTINUED | OUTPATIENT
Start: 2019-10-17 | End: 2019-10-17 | Stop reason: SURG

## 2019-10-17 RX ADMIN — PROPOFOL 50 MG: 10 INJECTION, EMULSION INTRAVENOUS at 11:30

## 2019-10-17 RX ADMIN — MIDAZOLAM 2 MG: 1 INJECTION INTRAMUSCULAR; INTRAVENOUS at 11:15

## 2019-10-17 RX ADMIN — FENTANYL CITRATE 50 MCG: 50 INJECTION, SOLUTION INTRAMUSCULAR; INTRAVENOUS at 11:18

## 2019-10-17 RX ADMIN — SODIUM CHLORIDE, SODIUM LACTATE, POTASSIUM CHLORIDE, AND CALCIUM CHLORIDE 75 ML/HR: .6; .31; .03; .02 INJECTION, SOLUTION INTRAVENOUS at 10:20

## 2019-10-17 RX ADMIN — FENTANYL CITRATE 50 MCG: 50 INJECTION, SOLUTION INTRAMUSCULAR; INTRAVENOUS at 11:34

## 2019-10-17 RX ADMIN — CEFAZOLIN SODIUM 2000 MG: 2 SOLUTION INTRAVENOUS at 11:14

## 2019-10-17 RX ADMIN — CEFAZOLIN SODIUM 1000 MG: 1 SOLUTION INTRAVENOUS at 11:14

## 2019-10-17 RX ADMIN — PROPOFOL 50 MG: 10 INJECTION, EMULSION INTRAVENOUS at 11:34

## 2019-10-17 NOTE — H&P
H&P Exam - Orthopedics   Meryle Pi 46 y o  female MRN: [id number]  Unit/Bed#: OR POOL    Assessment/Plan   Assessment:  R wrist DeQuervain's  Plan:  R DeQuervain's release today    History of Present Illness   HPI:  Meryle Pi is a 46 y o  female who presents with continued c/o R radial wrist pain into the base of her thumb  She has tried steroid injections x2 without full relief, as well as bracing, ice and activity modification       Historical Information  Review Of Systems:   · Skin: Normal  · Neuro: See HPI  · Musculoskeletal: See HPI  · 14 point review of systems negative except as stated above     Past Medical History:   Past Medical History:   Diagnosis Date    Anxiety     Asthma     Hypertension     off meds-stable    Murmur, heart     Seasonal allergies     Sinus problem     Tonsillitis        Past Surgical History:   Past Surgical History:   Procedure Laterality Date    CHOLECYSTECTOMY      WISDOM TOOTH EXTRACTION         Family History:  Family history reviewed and non-contributory  Family History   Problem Relation Age of Onset    Asthma Mother     COPD Mother     Eczema Mother     Emphysema Mother     Heart disease Father     Hypertension Father     Asthma Father     Alcohol abuse Father     Heart disease Paternal Aunt     Heart disease Paternal Uncle     Cancer Maternal Grandfather     Glaucoma Maternal Grandfather     Asthma Maternal Grandfather     Emphysema Maternal Grandfather     Cancer Paternal Grandfather     Hypertension Paternal Grandfather     Hypertension Maternal Grandmother     Emphysema Maternal Grandmother     Asthma Maternal Grandmother        Social History:  Social History     Socioeconomic History    Marital status: /Civil Union     Spouse name: None    Number of children: None    Years of education: None    Highest education level: None   Occupational History    None   Social Needs    Financial resource strain: None    Food insecurity:     Worry: None     Inability: None    Transportation needs:     Medical: None     Non-medical: None   Tobacco Use    Smoking status: Never Smoker    Smokeless tobacco: Never Used   Substance and Sexual Activity    Alcohol use: Yes     Frequency: Monthly or less     Drinks per session: 1 or 2     Binge frequency: Never    Drug use: Yes     Types: Marijuana     Comment: medical marijuana    Sexual activity: Yes     Partners: Male   Lifestyle    Physical activity:     Days per week: None     Minutes per session: None    Stress: None   Relationships    Social connections:     Talks on phone: None     Gets together: None     Attends [employer]ism service: None     Active member of club or organization: None     Attends meetings of clubs or organizations: None     Relationship status: None    Intimate partner violence:     Fear of current or ex partner: None     Emotionally abused: None     Physically abused: None     Forced sexual activity: None   Other Topics Concern    None   Social History Narrative    None       Allergies:   No Known Allergies        Labs:  0   Lab Value Date/Time    INR 1 00 02/09/2018 1304       Meds:  No current facility-administered medications for this encounter  Blood Culture:   No results found for: BLOODCX    Wound Culture:   No results found for: WOUNDCULT    Ins and Outs:  No intake/output data recorded  Physical Exam  There were no vitals taken for this visit  There were no vitals taken for this visit  Gen: Alert and oriented to person, place, time  HEENT: EOMI, eyes clear, moist mucus membranes, hearing intact  Respiratory: Bilateral chest rise   No audible wheezing found  Cardiovascular: Regular Rate and Rhythm  Abdomen: soft nontender/nondistended  Ortho Exam: TTP R radial styloid with + Finkelstein's test  Neuro Exam: Sensation and motor grossly intact    Lab Results: Reviewed  Imaging: Reviewed

## 2019-10-17 NOTE — PERIOPERATIVE NURSING NOTE
Dr Mercedes Rdz notified of increase blood pressure post operatively  Patient states, "I USED TO TAKE MEDICATION, NOW I USE MEDICAL MARIJUANA TO CONTROL BLOOD PRESSURE "   Explained to patient she should make appointment with primary care MD  Explained the risks of high blood pressure  Patient understood  NO treatment at this time  Last BP prior to discharge   174/89

## 2019-10-17 NOTE — ANESTHESIA PREPROCEDURE EVALUATION
Review of Systems/Medical History  Patient summary reviewed  Chart reviewed      Cardiovascular  Hypertension ,    Pulmonary       GI/Hepatic            Endo/Other     GYN       Hematology   Musculoskeletal       Neurology      Comment: Ms dx 2 yrs ago    mild Psychology   Anxiety,              Physical Exam    Airway    Mallampati score: II  TM Distance: >3 FB  Neck ROM: full     Dental       Cardiovascular  Rhythm: regular, Rate: normal,     Pulmonary      Other Findings        Anesthesia Plan  ASA Score- 3     Anesthesia Type- general with ASA Monitors  Additional Monitors:   Airway Plan: LMA  Plan Factors-    Induction- intravenous  Postoperative Plan- Plan for postoperative opioid use  Informed Consent- Anesthetic plan and risks discussed with patient  I personally reviewed this patient with the CRNA  Discussed and agreed on the Anesthesia Plan with the CRNA  Raymond Vides

## 2019-10-17 NOTE — OP NOTE
OPERATIVE REPORT  PATIENT NAME: Vergia Lennox  :  1980  MRN: [id number]  Pt Location: [facility]    SURGERY DATE: 10/17/19    Surgeon(s) and Role:     * Felicity Cranker, MD - Primary     * Ivory South PA-C - Assisting    Pre-Op Diagnosis:  De Quervain's tenosynovitis, right [M65 4]    Post-Op Diagnosis Codes:     * De Quervain's tenosynovitis, right [M65 4]    Procedure(s):  RELEASE  RIGHT DEQUERVAINS (Right)    Specimen(s):  * No orders in the log *    Estimated Blood Loss:   Minimal      Anesthesia Type:   General    Operative Indications: The patient has a history of de Quervain stenosis tenosynovitis  right that was recalcitrant to conservative management  The decision was made to bring the patient to the operating room for de Quervain release  right  Risks of the procedure were explained which include, but are not limited to bleeding; infection; damage to nerves, arteries,veins, tendons; scar; pain; need for reoperation; failure to give desired result; and risks of anaesthesia  All questions were answered to satisfaction and they were willing to proceed  Operative Findings:  Right de quervains release    Complications:   None    Procedure and Technique:  After the patient, site, and procedure were identified, the patient was brought into the operating room in a supine position  Local anaesthesia and sedation were adminstered in the operative area  A tourniquet was not used  The  right upper extremity was then prepped and drapped in a normal, sterile, orthopedic fashion  After the patient, site, and procedure were once again identified, attention was turned to the right wrist   A longitudinal incision was made over the first dorsal extensor compartment  Dissection was carried out inline with the extensor tendons  Care was taken to protect the superficial neurovascular structures including the branches of the superficial sensory branch of the radial nerve    The tendon sheath was identified and was divided inline with the tendons under direct visualization in its entirety  The extensor pollicis brevis was inspected to ensure complete release from any subcompartment  The abductor pollicis longus was also evaluated  The thumb was brought through a full range of motion to ensure complete release without any binding, catching, popping, subluxation, clicking, or locking  A loose stay suture was placed within the leaflets of the extensor tendon sheath to prevent tendon subluxation  At the completion of the procedure, hemostasis was obtained with cautery and direct pressure  The wounds were copiously irrigated with sterile solution  The wounds were closed with Vicryl, Monocryl, Histocryl and Steri-strips  Sterile dressings were applied, including Gauze and Tegaderm  Please note, all sponge, needle, and instrument counts were correct prior to closure  Loupe magnification was utilized  The patient tolerated the procedure well       I was present for the entire procedure, A qualified resident physician was not available and A physician assistant was required during the procedure for retraction tissue handling,dissection and suturing    Patient Disposition:  PACU  and hemodynamically stable    SIGNATURE: Geovany Manzo MD  DATE: 10/17/19  TIME: 11:50 AM

## 2019-10-21 ENCOUNTER — OFFICE VISIT (OUTPATIENT)
Dept: NEUROLOGY | Facility: CLINIC | Age: 39
End: 2019-10-21
Payer: COMMERCIAL

## 2019-10-21 VITALS
SYSTOLIC BLOOD PRESSURE: 138 MMHG | BODY MASS INDEX: 57.33 KG/M2 | WEIGHT: 293 LBS | DIASTOLIC BLOOD PRESSURE: 78 MMHG | HEART RATE: 86 BPM

## 2019-10-21 DIAGNOSIS — G35 MULTIPLE SCLEROSIS (HCC): Primary | ICD-10-CM

## 2019-10-21 PROCEDURE — 99214 OFFICE O/P EST MOD 30 MIN: CPT | Performed by: PSYCHIATRY & NEUROLOGY

## 2019-10-21 NOTE — PROGRESS NOTES
Patient ID: Niko Lowry is a 46 y o  female  Assessment/Plan:    Multiple sclerosis ([facility] [address] )  Pt here for neuro follow up  Pt overall doing very well  No new sxs  Pt just had tendon surgery right wrist less than one week ago and recovering well  No recent hospititalizations  Pt had one gi virus for 24 hours since last visit  Pt had labs done in Oct with normal lfts and cbc diff  Will update mri head and cspine to ensure efficacy of her copaxone  Pt matt copaxone well  No change in neuro exam       Diagnoses and all orders for this visit:    Multiple sclerosis ([facility] [address] )  -     BUN; Future  -     Creatinine, serum; Future  -     BUN; Future  -     MRI brain MS wo and w contrast; Future  -     MRI cervical spine with and without contrast; Future  -     CBC and differential; Future  -     Hepatic function panel; Future           Subjective:    HPI    Pt here for neuro follow up  Pt last seen in 5/29/19 by bony jenkins  per bony last note "Patient initially presented with right visual field disturbance in early November 2017   At time of presentation, patient noted needing to turn her head to see full picture on the right side to see her colleague who was sitting on the right side of her   MRI brain 11/24/17 with multiple supra and infratentorial lesions with varying degrees of enhancement and others nonenhancing   Lesions noted in the left cerebellar hemisphere measuring 9 mm with rim enhancement   Multiple supratentorial lesions and periventricular in location   Some of the periventricular lesions have a perpendicular orientation to the body of the lateral ventricle   The largest is in the right temporal parietal area all 11 mm with partial enhancement of the lesion  Sandra Frater is a 5 mm enhancing lesion within the occipital horn of the lateral ventricle   MRI cervical spine and MRI thoracic spine were completed in December 2017   No cord lesions found on either study   Patient had a lumbar puncture done as well   Negative Gram stain, negative Lyme PC R  MS panel with positive oligoclonal bands of 4 (4 oligoclonal bands in the CSF only, in addition to 4 paired bands in both CSF and serum), elevated myelin basic protein 2 5 and elevated IgG synthesis rate of 5 3   Negative CSF NMO   Negative CSF ACE   Viral comprehensive PCR negative  Due to positive LP as well as multiple enhancing and nonenhancing lesions seen on MRI brain, diagnosis of MS was made  Junette Homans was JCV negative with index 0 09  Patient chose to start Copaxone as her 1st IMD therapy   "    Kept above paragraph due to complexities of pt case  Overall pt doing well since last visit  Pt denies any new sxs  No falls or trips  No change in bowel or bladder  No LOC, no seizure  No change in speech or swallowing  No change in vision  No loss of vision  No diplopia  No loss of vision  No headache, no nausea or vomiting  No recent hospitalizations  No recent infections  Pt did have surgery on her right wrist for her tendon repair  Overall healing nicely and less than one week since procedure  Pt is following up at the end of this week for review  No fever or chills  No new sxs  Pt notes only one infection since last visit ie gi bug for about 24 hours that resolved on its own  Pt notes she is going thru marital separation  Pt aware of our SW and ms team support as well  Pt notes she has great family support  Overall doing ok  Pt notes she is matt her copaxone well  No issues with injection  No issues with tolerability  No vertigo  Pt due for updated imaging to compare for radiographical stabiity          The following portions of the patient's history were reviewed and updated as appropriate: allergies, current medications, past family history, past medical history, past social history, past surgical history and problem list and ros rev           Objective:    Blood pressure 138/78, pulse 86, weight (!) 152 kg (334 lb), last menstrual period 10/03/2019  Physical Exam   Constitutional: She appears well-developed and well-nourished  Eyes: Pupils are equal, round, and reactive to light  Lids are normal    Cardiovascular: Intact distal pulses  Neurological: She has normal strength and normal reflexes  Psychiatric: Her speech is normal        Neurological Exam  Mental Status  Awake, alert and oriented to person, place and time  Recent and remote memory are intact  Speech is normal  Language is fluent with no aphasia  Attention and concentration are normal  Fund of knowledge is appropriate for level of education  Cranial Nerves  CN II: Visual acuity is normal  Visual fields full to confrontation  Right funduscopic exam: disc intact  Left funduscopic exam: disc intact  CN III, IV, VI: Extraocular movements intact bilaterally  Normal lids and orbits bilaterally  Pupils equal round and reactive to light bilaterally  CN V: Facial sensation is normal   CN VII: Full and symmetric facial movement  CN VIII: Hearing is normal   CN IX, X: Palate elevates symmetrically  Normal gag reflex  CN XI: Shoulder shrug strength is normal   CN XII: Tongue midline without atrophy or fasciculations  Motor  Normal muscle bulk throughout  Normal muscle tone  No abnormal involuntary movements  Strength is 5/5 throughout all four extremities  Sensory  Sensation is intact to light touch, pinprick, vibration and proprioception in all four extremities  Reflexes  Deep tendon reflexes are 2+ and symmetric in all four extremities with downgoing toes bilaterally  Coordination  Right: Finger-to-nose normal  Rapid alternating movement normal   Left: Finger-to-nose normal  Rapid alternating movement normal     Gait  Casual gait is normal including stance, stride, and arm swing  ROS:    Review of Systems   Constitutional: Negative  Negative for appetite change and fever  HENT: Negative    Negative for hearing loss, tinnitus, trouble swallowing and voice change  Eyes: Negative  Negative for photophobia and pain  Respiratory: Negative  Negative for shortness of breath  Cardiovascular: Negative  Negative for palpitations  Gastrointestinal: Negative  Negative for nausea and vomiting  Endocrine: Negative  Negative for cold intolerance and heat intolerance  Genitourinary: Negative  Negative for dysuria, frequency and urgency  Musculoskeletal: Negative  Negative for myalgias and neck pain  Skin: Negative  Negative for rash  Neurological: Negative  Negative for dizziness, tremors, seizures, syncope, facial asymmetry, speech difficulty, weakness, light-headedness, numbness and headaches  Hematological: Negative  Does not bruise/bleed easily  Psychiatric/Behavioral: Negative  Negative for confusion, hallucinations and sleep disturbance

## 2019-10-21 NOTE — PROGRESS NOTES
Patient ID: Dany Verma is a 46 y o  female  Assessment/Plan:    No problem-specific Assessment & Plan notes found for this encounter  {Assess/PlanSmartLinks:78379}       Subjective:    HPI    {[facility] HPI texts:96110}    {Common ambulatory SmartLinks:75357}         Objective:    Last menstrual period 10/03/2019  Physical Exam    Neurological Exam      ROS:    Review of Systems   Constitutional: Negative  Negative for appetite change and fever  HENT: Negative  Negative for hearing loss, tinnitus, trouble swallowing and voice change  Eyes: Negative  Negative for photophobia and pain  Respiratory: Negative  Negative for shortness of breath  Cardiovascular: Negative  Negative for palpitations  Gastrointestinal: Negative  Negative for nausea and vomiting  Endocrine: Negative  Negative for cold intolerance and heat intolerance  Genitourinary: Negative  Negative for dysuria, frequency and urgency  Musculoskeletal: Negative  Negative for myalgias and neck pain  Skin: Negative  Negative for rash  Neurological: Negative  Negative for dizziness, tremors, seizures, syncope, facial asymmetry, speech difficulty, weakness, light-headedness, numbness and headaches  Hematological: Negative  Does not bruise/bleed easily  Psychiatric/Behavioral: Negative  Negative for confusion, hallucinations and sleep disturbance

## 2019-10-25 ENCOUNTER — OFFICE VISIT (OUTPATIENT)
Dept: OBGYN CLINIC | Facility: HOSPITAL | Age: 39
End: 2019-10-25

## 2019-10-25 VITALS — HEIGHT: 64 IN | BODY MASS INDEX: 50.02 KG/M2 | WEIGHT: 293 LBS

## 2019-10-25 DIAGNOSIS — M65.4 DE QUERVAIN'S TENOSYNOVITIS, RIGHT: Primary | ICD-10-CM

## 2019-10-25 PROCEDURE — 99024 POSTOP FOLLOW-UP VISIT: CPT | Performed by: ORTHOPAEDIC SURGERY

## 2019-10-25 NOTE — PROGRESS NOTES
Assessment:   S/p right de quervains release on 10/17/19    Plan: It was discussed with the patient that they may now begin to wash their incision site with soap and water  They were instructed to let the steri-strips fall off on their own at this point in time and when they do they may begin to apply lotion to their incision site  Lotion enriched in vitamin E, coca butter, scarzone and maderma was discussed with the patient  Follow Up:  PRN    To Do Next Visit:         CHIEF COMPLAINT:  Chief Complaint   Patient presents with    Right Wrist - Post-op         SUBJECTIVE:  Yessenia Avelar is a 46 y o  female who presents for follow up after S/p right de quervains release on 10/17/19  Today patient has Pain  Mild  Infrequent  Sharp  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        PHYSICAL EXAMINATION:  Vital signs: Ht 5' 4" (1 626 m)   Wt (!) 151 kg (332 lb)   LMP 10/03/2019   BMI 56 99 kg/m²   General: well developed and well nourished, alert, oriented times 3 and appears comfortable  Psychiatric: Normal    MUSCULOSKELETAL EXAMINATION:  Incision: Clean, dry, intact  Range of Motion: opposition intact and full composite fist possible  Neurovascular status: Neuro intact, good cap refill  Activity Restrictions: No restrictions  Done today: Sutures out and Steri strips applied      STUDIES REVIEWED:  No Studies to review      PROCEDURES PERFORMED:  Procedures  No Procedures performed today   Scribe Attestation    I,:   Loretta Gutierrez am acting as a scribe while in the presence of the attending physician :        I,:   Viviana Reis MD personally performed the services described in this documentation    as scribed in my presence :

## 2019-10-31 DIAGNOSIS — G35 MULTIPLE SCLEROSIS (HCC): ICD-10-CM

## 2019-10-31 RX ORDER — GLATIRAMER ACETATE 40 MG/ML
40 INJECTION, SOLUTION SUBCUTANEOUS 3 TIMES WEEKLY
Qty: 12 SYRINGE | Refills: 6 | Status: SHIPPED | OUTPATIENT
Start: 2019-11-01 | End: 2020-05-05

## 2019-10-31 NOTE — TELEPHONE ENCOUNTER
[facility] faxed med refill request for pt Copaxone PFS 40mg/ml qty of 1*12ml=12ml total  Pt last ov 10/21/19 next f/u 2/7/2020

## 2019-11-07 ENCOUNTER — HOSPITAL ENCOUNTER (OUTPATIENT)
Dept: RADIOLOGY | Facility: HOSPITAL | Age: 39
Discharge: HOME | End: 2019-11-07
Attending: PSYCHIATRY & NEUROLOGY
Payer: COMMERCIAL

## 2019-11-07 DIAGNOSIS — G35 MULTIPLE SCLEROSIS (HCC): ICD-10-CM

## 2019-11-07 PROCEDURE — A9585 GADOBUTROL INJECTION: HCPCS | Performed by: PSYCHIATRY & NEUROLOGY

## 2019-11-07 PROCEDURE — 72156 MRI NECK SPINE W/O & W/DYE: CPT

## 2019-11-07 RX ADMIN — GADOBUTROL 14 ML: 604.72 INJECTION INTRAVENOUS at 20:09

## 2019-11-14 ENCOUNTER — HOSPITAL ENCOUNTER (OUTPATIENT)
Dept: RADIOLOGY | Facility: HOSPITAL | Age: 39
Discharge: HOME | End: 2019-11-14
Attending: PSYCHIATRY & NEUROLOGY
Payer: COMMERCIAL

## 2019-11-14 DIAGNOSIS — G35 MULTIPLE SCLEROSIS (HCC): ICD-10-CM

## 2019-11-14 PROCEDURE — A9585 GADOBUTROL INJECTION: HCPCS | Performed by: PSYCHIATRY & NEUROLOGY

## 2019-11-14 PROCEDURE — 70553 MRI BRAIN STEM W/O & W/DYE: CPT

## 2019-11-14 RX ADMIN — GADOBUTROL 13 ML: 604.72 INJECTION INTRAVENOUS at 20:31

## 2019-12-02 ENCOUNTER — TELEPHONE (OUTPATIENT)
Dept: NEUROLOGY | Facility: CLINIC | Age: 39
End: 2019-12-02

## 2019-12-27 ENCOUNTER — OFFICE VISIT (OUTPATIENT)
Dept: URGENT CARE | Facility: MEDICAL CENTER | Age: 39
End: 2019-12-27
Payer: COMMERCIAL

## 2019-12-27 VITALS
DIASTOLIC BLOOD PRESSURE: 74 MMHG | TEMPERATURE: 97.3 F | WEIGHT: 293 LBS | RESPIRATION RATE: 18 BRPM | HEIGHT: 64 IN | BODY MASS INDEX: 50.02 KG/M2 | HEART RATE: 75 BPM | OXYGEN SATURATION: 99 % | SYSTOLIC BLOOD PRESSURE: 120 MMHG

## 2019-12-27 DIAGNOSIS — J01.00 ACUTE NON-RECURRENT MAXILLARY SINUSITIS: Primary | ICD-10-CM

## 2019-12-27 PROCEDURE — 99213 OFFICE O/P EST LOW 20 MIN: CPT | Performed by: PHYSICIAN ASSISTANT

## 2019-12-27 RX ORDER — AMOXICILLIN AND CLAVULANATE POTASSIUM 875; 125 MG/1; MG/1
1 TABLET, FILM COATED ORAL EVERY 12 HOURS SCHEDULED
Qty: 14 TABLET | Refills: 0 | Status: SHIPPED | OUTPATIENT
Start: 2019-12-27 | End: 2020-01-03

## 2019-12-27 NOTE — PATIENT INSTRUCTIONS
Take Augmentin as prescribed  Take 1 tablet, every 12 hours (twice a day), for 7 days  Take with probiotics/ yogurt to prevent yeast infection  Can take tylenol or Advil for aches/pains  Can use Mucinex DM for congestion  Can also use flonase, two sprays each nostril, once a day  Follow up with PCP in 3-5 days if sx do not resolve  Go to the ER if symptoms become severe  Sinusitis   WHAT YOU NEED TO KNOW:   Sinusitis is inflammation or infection of your sinuses  It is most often caused by a virus  Acute sinusitis may last up to 12 weeks  Chronic sinusitis lasts longer than 12 weeks  Recurrent sinusitis means you have 4 or more times in 1 year  DISCHARGE INSTRUCTIONS:   Return to the emergency department if:   · Your eye and eyelid are red, swollen, and painful  · You cannot open your eye  · You have vision changes, such as double vision  · Your eyeball bulges out or you cannot move your eye  · You are more sleepy than normal, or you notice changes in your ability to think, move, or talk  · You have a stiff neck, a fever, or a bad headache  · You have swelling of your forehead or scalp  Contact your healthcare provider if:   · Your symptoms do not improve after 3 days  · Your symptoms do not go away after 10 days  · You have nausea and are vomiting  · Your nose is bleeding  · You have questions or concerns about your condition or care  Medicines: Your symptoms may go away on their own  Your healthcare provider may recommend watchful waiting for up to 10 days before starting antibiotics  You may  need any of the following:  · Acetaminophen  decreases pain and fever  It is available without a doctor's order  Ask how much to take and how often to take it  Follow directions  Read the labels of all other medicines you are using to see if they also contain acetaminophen, or ask your doctor or pharmacist  Acetaminophen can cause liver damage if not taken correctly   Do not use more than 4 grams (4,000 milligrams) total of acetaminophen in one day  · NSAIDs , such as ibuprofen, help decrease swelling, pain, and fever  This medicine is available with or without a doctor's order  NSAIDs can cause stomach bleeding or kidney problems in certain people  If you take blood thinner medicine, always ask your healthcare provider if NSAIDs are safe for you  Always read the medicine label and follow directions  · Nasal steroid sprays  may help decrease inflammation in your nose and sinuses  · Decongestants  help reduce swelling and drain mucus in the nose and sinuses  They may help you breathe easier  · Antihistamines  help dry mucus in the nose and relieve sneezing  · Antibiotics  help treat or prevent a bacterial infection  · Take your medicine as directed  Contact your healthcare provider if you think your medicine is not helping or if you have side effects  Tell him or her if you are allergic to any medicine  Keep a list of the medicines, vitamins, and herbs you take  Include the amounts, and when and why you take them  Bring the list or the pill bottles to follow-up visits  Carry your medicine list with you in case of an emergency  Self-care:   · Rinse your sinuses  Use a sinus rinse device to rinse your nasal passages with a saline (salt water) solution or distilled water  Do not use tap water  This will help thin the mucus in your nose and rinse away pollen and dirt  It will also help reduce swelling so you can breathe normally  Ask your healthcare provider how often to do this  · Breathe in steam   Heat a bowl of water until you see steam  Lean over the bowl and make a tent over your head with a large towel  Breathe deeply for about 20 minutes  Be careful not to get too close to the steam or burn yourself  Do this 3 times a day  You can also breathe deeply when you take a hot shower  · Sleep with your head elevated    Place an extra pillow under your head before you go to sleep to help your sinuses drain  · Drink liquids as directed  Ask your healthcare provider how much liquid to drink each day and which liquids are best for you  Liquids will thin the mucus in your nose and help it drain  Avoid drinks that contain alcohol or caffeine  · Do not smoke, and avoid secondhand smoke  Nicotine and other chemicals in cigarettes and cigars can make your symptoms worse  Ask your healthcare provider for information if you currently smoke and need help to quit  E-cigarettes or smokeless tobacco still contain nicotine  Talk to your healthcare provider before you use these products  Prevent the spread of germs that cause sinusitis:  Wash your hands often with soap and water  Wash your hands after you use the bathroom, change a child's diaper, or sneeze  Wash your hands before you prepare or eat food  Follow up with your healthcare provider as directed: You may be referred to an ear, nose, and throat specialist  Write down your questions so you remember to ask them during your visits  © 2017 [facility] Information is for End User's use only and may not be sold, redistributed or otherwise used for commercial purposes  All illustrations and images included in CareNotes® are the copyrighted property of [employer] , [employer]  or Vick Cruz  The above information is an  only  It is not intended as medical advice for individual conditions or treatments  Talk to your doctor, nurse or pharmacist before following any medical regimen to see if it is safe and effective for you

## 2019-12-27 NOTE — PROGRESS NOTES
[facility]        NAME: Sharmila Keller is a 46 y o  female  : 1980    MRN: [id number]  DATE: 2019  TIME: 8:06 AM    Assessment and Plan   Acute non-recurrent maxillary sinusitis [J01 00]  1  Acute non-recurrent maxillary sinusitis  amoxicillin-clavulanate (AUGMENTIN) 875-125 mg per tablet     Assessment/Plan:  1  Acute non-recurrent maxillary sinusitis   - Take Augmentin as prescribed  Take 1 tablet, every 12 hours (twice a day), for 7 days  Take with probiotics/ yogurt to prevent yeast infection  Can take tylenol or Advil for aches/pains  Can use Mucinex DM for congestion  Can also use flonase, two sprays each nostril, once a day  Follow up with PCP in 3-5 days if sx do not resolve  Go to the ER if symptoms become severe  Patient Instructions       Follow up with PCP in 3-5 days  Proceed to  ER if symptoms worsen  Chief Complaint     Chief Complaint   Patient presents with    Cold Like Symptoms     Patient c/o nasal congestion, ear pain, headache , and sore thorat x 3 weeks          History of Present Illness       Pt is a 46 y o female presenting to urgent care with cold like sxs x 3 weeks  Pt states she has had congestion and headaches for three weeks  Her symptoms came on gradually  She initially thought her symptoms were due to allergies but they did not go away with conservative treatment for 3 weeks  She states when she blows her nose, "there is a dark yellow mucus"  Associated symptoms include right ear pain, sinus pressure, chills, minor cough, and body aches  She also states her throat is "scratchy" which she attributes to coughing  Pt denies fevers, N/V/D, CP, SOB  Pt has not used OTC meds recently  She has not been around sick contacts  Review of Systems   Review of Systems   Constitutional: Negative for activity change, appetite change, chills, diaphoresis, fatigue and fever     HENT: Positive for congestion, ear pain, rhinorrhea, sinus pressure, sinus pain and sore throat (positive for "scratchy throat")  Negative for ear discharge, hearing loss, postnasal drip and trouble swallowing  Eyes: Negative for pain, discharge, redness and itching  Respiratory: Positive for cough  Negative for chest tightness, shortness of breath, wheezing and stridor  Cardiovascular: Negative for chest pain, palpitations and leg swelling  Gastrointestinal: Negative for abdominal distention, abdominal pain, diarrhea, nausea and vomiting  Musculoskeletal: Negative for arthralgias, myalgias, neck pain and neck stiffness  Skin: Negative for color change, pallor and rash  Neurological: Negative for dizziness, syncope, weakness, light-headedness, numbness and headaches           Current Medications       Current Outpatient Medications:     acetaminophen (TYLENOL 8 HOUR) 650 mg CR tablet, Take 1 tablet (650 mg total) by mouth every 8 (eight) hours, Disp: 15 tablet, Rfl: 0    ALBUTEROL IN, Inhale 2 puffs as needed, Disp: , Rfl:     amoxicillin-clavulanate (AUGMENTIN) 875-125 mg per tablet, Take 1 tablet by mouth every 12 (twelve) hours for 7 days, Disp: 14 tablet, Rfl: 0    COPAXONE 40 MG/ML SOSY, Inject 1 mL (40 mg total) under the skin 3 (three) times a week, Disp: 12 Syringe, Rfl: 6    fluticasone (FLONASE) 50 mcg/act nasal spray, daily as needed , Disp: , Rfl: 0    loratadine (CLARITIN) 10 mg tablet, Take 10 mg by mouth daily, Disp: , Rfl:     naproxen sodium (ALEVE) 220 MG tablet, Take 1 tablet (220 mg total) by mouth 2 (two) times a day with meals for 5 days, Disp: 10 tablet, Rfl: 0    naproxen sodium (ANAPROX) 550 mg tablet, Take 550 mg by mouth, Disp: , Rfl:     NON FORMULARY, , Disp: , Rfl:     Current Allergies     Allergies as of 12/27/2019    (No Known Allergies)            The following portions of the patient's history were reviewed and updated as appropriate: allergies, current medications, past family history, past medical history, past social history, past surgical history and problem list      Past Medical History:   Diagnosis Date    Anxiety     Asthma     Hypertension     off meds-stable    Murmur, heart     Seasonal allergies     Sinus problem     Tonsillitis        Past Surgical History:   Procedure Laterality Date    CHOLECYSTECTOMY      AL INCIS TENDON SHEATH,RADIAL STYLOID Right 10/17/2019    Procedure: RELEASE  RIGHT Atlee Drier;  Surgeon: Lesly Mcdaniel MD;  Location: [facility] OR;  Service: Orthopedics    WISDOM TOOTH EXTRACTION         Family History   Problem Relation Age of Onset    Asthma Mother     COPD Mother     Eczema Mother     Emphysema Mother     Heart disease Father     Hypertension Father     Asthma Father     Alcohol abuse Father     Heart disease Paternal Aunt     Heart disease Paternal Uncle     Cancer Maternal Grandfather     Glaucoma Maternal Grandfather     Asthma Maternal Grandfather     Emphysema Maternal Grandfather     Cancer Paternal Grandfather     Hypertension Paternal Grandfather     Hypertension Maternal Grandmother     Emphysema Maternal Grandmother     Asthma Maternal Grandmother          Medications have been verified  Objective   /74   Pulse 75   Temp (!) 97 3 °F (36 3 °C)   Resp 18   Ht 5' 4" (1 626 m)   Wt (!) 149 kg (328 lb)   SpO2 99%   BMI 56 30 kg/m²        Physical Exam     Physical Exam   Constitutional: She is oriented to person, place, and time  She appears well-developed and well-nourished  No distress  HENT:   Head: Normocephalic and atraumatic  Right Ear: Hearing, external ear and ear canal normal  Tympanic membrane is erythematous (mild erythema noted surrounding R TM, no signs of infection)  Left Ear: Hearing, tympanic membrane, external ear and ear canal normal    Nose: Mucosal edema and rhinorrhea present  Right sinus exhibits maxillary sinus tenderness  Right sinus exhibits no frontal sinus tenderness  Left sinus exhibits maxillary sinus tenderness  Left sinus exhibits no frontal sinus tenderness  Mouth/Throat: Posterior oropharyngeal edema and posterior oropharyngeal erythema present  No oropharyngeal exudate  Tonsils are 3+ on the right  Tonsils are 3+ on the left  No tonsillar exudate  PND noted in posterior oropharynx   Eyes: Conjunctivae are normal  Right eye exhibits no discharge  Left eye exhibits no discharge  Neck: Normal range of motion  Neck supple  Cardiovascular: Normal rate, regular rhythm, normal heart sounds and intact distal pulses  No murmur heard  Pulmonary/Chest: Effort normal and breath sounds normal  No stridor  No respiratory distress  She has no wheezes  She has no rales  Musculoskeletal: Normal range of motion  She exhibits no tenderness  Lymphadenopathy:     She has cervical adenopathy  Neurological: She is alert and oriented to person, place, and time  Skin: Skin is warm and dry  No rash noted  She is not diaphoretic  No erythema  Nursing note and vitals reviewed

## 2020-02-07 ENCOUNTER — OFFICE VISIT (OUTPATIENT)
Dept: NEUROLOGY | Facility: CLINIC | Age: 40
End: 2020-02-07
Payer: COMMERCIAL

## 2020-02-07 VITALS
WEIGHT: 293 LBS | SYSTOLIC BLOOD PRESSURE: 124 MMHG | HEART RATE: 75 BPM | BODY MASS INDEX: 57.26 KG/M2 | DIASTOLIC BLOOD PRESSURE: 72 MMHG

## 2020-02-07 DIAGNOSIS — G35 MULTIPLE SCLEROSIS (HCC): Primary | ICD-10-CM

## 2020-02-07 PROCEDURE — 3074F SYST BP LT 130 MM HG: CPT | Performed by: PSYCHIATRY & NEUROLOGY

## 2020-02-07 PROCEDURE — 3078F DIAST BP <80 MM HG: CPT | Performed by: PSYCHIATRY & NEUROLOGY

## 2020-02-07 PROCEDURE — 99213 OFFICE O/P EST LOW 20 MIN: CPT | Performed by: PSYCHIATRY & NEUROLOGY

## 2020-02-07 PROCEDURE — 1036F TOBACCO NON-USER: CPT | Performed by: PSYCHIATRY & NEUROLOGY

## 2020-02-07 NOTE — PATIENT INSTRUCTIONS
Multiple sclerosis ([facility] [address] )  Pt here for neuro follow up  Overall doing well  Pt did have sinus infection in dec   Pt then has had 3 weeks of uri sxs with cough and congestion which has slowly resolved  No fever or chills now  No recent hospitalizations  Exam stable  Pt had updated mri head and cspine since last visit in nov and both stable no new lesions  Pt to cont with copaxone brand tiw  Pt had labs in nov but only bun and cr  Last labs from oct were reviewed at last visit  Will updated labs this month

## 2020-02-07 NOTE — ASSESSMENT & PLAN NOTE
Pt here for neuro follow up  Overall doing well  Pt did have sinus infection in dec   Pt then has had 3 weeks of uri sxs with cough and congestion which has slowly resolved  No fever or chills now  No recent hospitalizations  Exam stable  Pt had updated mri head and cspine since last visit in nov and both stable no new lesions  Pt to cont with copaxone brand tiw  Pt had labs in nov but only bun and cr  Last labs from oct were reviewed at last visit  Will updated labs this month

## 2020-02-07 NOTE — PROGRESS NOTES
Patient ID: Christy Thomson is a 46 y o  female  Assessment/Plan:    Multiple sclerosis (HCC)  Pt here for neuro follow up  Overall doing well  Pt did have sinus infection in dec   Pt then has had 3 weeks of uri sxs with cough and congestion which has slowly resolved  No fever or chills now  No recent hospitalizations  Exam stable  Pt had updated mri head and cspine since last visit in nov and both stable no new lesions  Pt to cont with copaxone brand tiw  Pt had labs in nov but only bun and cr  Last labs from oct were reviewed at last visit  Will updated labs this month       Diagnoses and all orders for this visit:    Multiple sclerosis ([facility] [address] )  -     CBC and differential; Future  -     Hepatic function panel; Future           Subjective:    HPI    Pt here for neuro follow up  Pt last seen on 10/21/19 with me    Per my last note "Patient initially presented with right visual field disturbance in early November 2017   At time of presentation, patient noted needing to turn her head to see full picture on the right side to see her colleague who was sitting on the right side of her   MRI brain 11/24/17 with multiple supra and infratentorial lesions with varying degrees of enhancement and others nonenhancing   Lesions noted in the left cerebellar hemisphere measuring 9 mm with rim enhancement   Multiple supratentorial lesions and periventricular in location   Some of the periventricular lesions have a perpendicular orientation to the body of the lateral ventricle   The largest is in the right temporal parietal area all 11 mm with partial enhancement of the lesion  Genene Nan is a 5 mm enhancing lesion within the occipital horn of the lateral ventricle   MRI cervical spine and MRI thoracic spine were completed in December 2017   No cord lesions found on either study   Patient had a lumbar puncture done as well   Negative Gram stain, negative Lyme PC R  MS panel with positive oligoclonal bands of 4 (4 oligoclonal bands in the CSF only, in addition to 4 paired bands in both CSF and serum), elevated myelin basic protein 2 5 and elevated IgG synthesis rate of 5 3   Negative CSF NMO   Negative CSF ACE   Viral comprehensive PCR negative  Due to positive LP as well as multiple enhancing and nonenhancing lesions seen on MRI brain, diagnosis of MS was made  Usman Richardson was JCV negative with index 0 09  Patient chose to start Copaxone as her 1st IMD therapy   "     Kept above paragraph due to complexities of pt case  Overall pt doing very well  Pt notes no new MS sxs  No falls or trips  No loc  No sz  No change in boel or bladder  Pt has an upcoming appt scheduled for end of feb for a d and c   Pt notes she was found to have fibroids  Pt was having cramping and painful menses with heavier flow  Pt notes overall from [address] standpoint doing well  No recent hospitalizations  Pt had issues in dec with ongoing sinus infection and treated with abx  Pt then had about 3 weeks of cold and uri sxs in January  This was associated with cough as well  Pt notes this seems to finally have dissipated  Pt notes no worsening of her MS during that time  Pt notes she has had no change in speech or swallowing  Pt notes no vertigo or dizziness  Pt had updated mri head on nov 14 2019 comp to dec 2018 and stable  Mri c spine from nov 7 2019 no cord signal abn  Pt with known disc protrusion at C6/7 with some cervical spondylosis  Pt notes no neck pain  No radicular sxs  No myelopathic sxs  Pt had updated labs in nov but these were only her cr and bun  Last MS labs in oct with  normal cbc and lfts  Pt needs labs updated this month  No new visual sxs  No loss of vision    No diplopia              The following portions of the patient's history were reviewed and updated as appropriate: allergies, current medications, past family history, past medical history, past social history, past surgical history and problem list          Objective:    Blood pressure 124/72, pulse 75, weight (!) 151 kg (333 lb 9 6 oz)  Physical Exam   Constitutional: She appears well-developed and well-nourished  Eyes: Pupils are equal, round, and reactive to light  Lids are normal    Cardiovascular: Intact distal pulses  Neurological: She has normal strength and normal reflexes  Psychiatric: Her speech is normal        Neurological Exam  Mental Status  Awake, alert and oriented to person, place and time  Recent and remote memory are intact  Speech is normal  Language is fluent with no aphasia  Attention and concentration are normal  Fund of knowledge is appropriate for level of education  Cranial Nerves  CN II: Visual acuity is normal  Visual fields full to confrontation  Right funduscopic exam: disc intact  Left funduscopic exam: disc intact  CN III, IV, VI: Extraocular movements intact bilaterally  Normal lids and orbits bilaterally  Pupils equal round and reactive to light bilaterally  CN V: Facial sensation is normal   CN VII: Full and symmetric facial movement  CN VIII: Hearing is normal   CN IX, X: Palate elevates symmetrically  Normal gag reflex  CN XI: Shoulder shrug strength is normal   CN XII: Tongue midline without atrophy or fasciculations  Motor  Normal muscle bulk throughout  Normal muscle tone  No abnormal involuntary movements  Strength is 5/5 throughout all four extremities  Sensory  Sensation is intact to light touch, pinprick, vibration and proprioception in all four extremities  Reflexes  Deep tendon reflexes are 2+ and symmetric in all four extremities with downgoing toes bilaterally  Coordination  Right: Finger-to-nose normal  Rapid alternating movement normal   Left: Finger-to-nose normal  Rapid alternating movement normal     Gait  Casual gait is normal including stance, stride, and arm swing  ROS:    Review of Systems   Constitutional: Negative  Negative for appetite change and fever  HENT: Negative    Negative for hearing loss, tinnitus, trouble swallowing and voice change  Eyes: Negative  Negative for photophobia and pain  Respiratory: Negative  Negative for shortness of breath  Cardiovascular: Negative  Negative for palpitations  Gastrointestinal: Negative  Negative for nausea and vomiting  Endocrine: Negative  Negative for cold intolerance and heat intolerance  Genitourinary: Negative  Negative for dysuria, frequency and urgency  Musculoskeletal: Negative  Negative for myalgias and neck pain  Skin: Negative  Negative for rash  Neurological: Negative  Negative for dizziness, tremors, seizures, syncope, facial asymmetry, speech difficulty, weakness, light-headedness, numbness and headaches  Hematological: Negative  Does not bruise/bleed easily  Psychiatric/Behavioral: Negative  Negative for confusion, hallucinations and sleep disturbance

## 2020-02-20 ENCOUNTER — OFFICE VISIT (OUTPATIENT)
Dept: URGENT CARE | Facility: MEDICAL CENTER | Age: 40
End: 2020-02-20
Payer: COMMERCIAL

## 2020-02-20 VITALS
BODY MASS INDEX: 50.02 KG/M2 | HEIGHT: 64 IN | WEIGHT: 293 LBS | TEMPERATURE: 98.1 F | RESPIRATION RATE: 20 BRPM | HEART RATE: 77 BPM | OXYGEN SATURATION: 98 %

## 2020-02-20 DIAGNOSIS — J06.9 ACUTE URI: Primary | ICD-10-CM

## 2020-02-20 DIAGNOSIS — J40 BRONCHITIS: ICD-10-CM

## 2020-02-20 PROCEDURE — 99214 OFFICE O/P EST MOD 30 MIN: CPT | Performed by: FAMILY MEDICINE

## 2020-02-20 RX ORDER — ALBUTEROL SULFATE 90 UG/1
2 AEROSOL, METERED RESPIRATORY (INHALATION) EVERY 6 HOURS PRN
Qty: 1 INHALER | Refills: 0 | Status: SHIPPED | OUTPATIENT
Start: 2020-02-20

## 2020-02-20 RX ORDER — AZITHROMYCIN 250 MG/1
TABLET, FILM COATED ORAL
Qty: 6 TABLET | Refills: 0 | Status: SHIPPED | OUTPATIENT
Start: 2020-02-20 | End: 2020-02-24

## 2020-02-20 RX ORDER — FLUTICASONE PROPIONATE 50 MCG
2 SPRAY, SUSPENSION (ML) NASAL DAILY
Qty: 16 G | Refills: 0 | Status: SHIPPED | OUTPATIENT
Start: 2020-02-20 | End: 2020-03-12 | Stop reason: SDUPTHER

## 2020-02-21 NOTE — PROGRESS NOTES
[facility]        NAME: Yudi Caputo is a 46 y o  female  : 1980    MRN: [id number]  DATE: 2020  TIME: 8:47 PM    Assessment and Plan   Acute URI [J06 9]  1  Acute URI  fluticasone (FLONASE) 50 mcg/act nasal spray    azithromycin (ZITHROMAX) 250 mg tablet    albuterol (PROVENTIL HFA,VENTOLIN HFA) 90 mcg/act inhaler   2  Bronchitis  fluticasone (FLONASE) 50 mcg/act nasal spray    azithromycin (ZITHROMAX) 250 mg tablet    albuterol (PROVENTIL HFA,VENTOLIN HFA) 90 mcg/act inhaler         Patient Instructions       Follow up with PCP in 3-5 days  Proceed to  ER if symptoms worsen  Chief Complaint     Chief Complaint   Patient presents with   Rebecca Flakes Like Symptoms     Patient states she has been sick for about a month  Patient states that it went away and then started again begining of Feb   This Sat she started with cough again, expectoraing white phlegm, nasal congestion, no fever         History of Present Illness       43-year-old female here today with a five-day history of cold symptoms  Symptoms consist of nasal congestion, mild sinus pressure  Denies postnasal drip  Cough has been mainly nonproductive  Describes some shortness of breath wheeze  History of asthma but she does not use an inhaler  Denies fever  She has been using course seen cough syrup with minimal improvement  She informs me she has been sick on and off for the last 2 months since December  Review of Systems   Review of Systems   Constitutional: Negative  HENT: Positive for congestion and sinus pressure  Respiratory: Positive for cough, shortness of breath and wheezing            Current Medications       Current Outpatient Medications:     acetaminophen (TYLENOL 8 HOUR) 650 mg CR tablet, Take 1 tablet (650 mg total) by mouth every 8 (eight) hours, Disp: 15 tablet, Rfl: 0    ALBUTEROL IN, Inhale 2 puffs as needed, Disp: , Rfl:     COPAXONE 40 MG/ML SOSY, Inject 1 mL (40 mg total) under the skin 3 (three) times a week, Disp: 12 Syringe, Rfl: 6    loratadine (CLARITIN) 10 mg tablet, Take 10 mg by mouth daily, Disp: , Rfl:     NON FORMULARY, , Disp: , Rfl:     albuterol (PROVENTIL HFA,VENTOLIN HFA) 90 mcg/act inhaler, Inhale 2 puffs every 6 (six) hours as needed for wheezing, Disp: 1 Inhaler, Rfl: 0    azithromycin (ZITHROMAX) 250 mg tablet, Take 2 tablets today then 1 tablet daily x 4 days, Disp: 6 tablet, Rfl: 0    fluticasone (FLONASE) 50 mcg/act nasal spray, 2 sprays into each nostril daily, Disp: 16 g, Rfl: 0    naproxen sodium (ALEVE) 220 MG tablet, Take 1 tablet (220 mg total) by mouth 2 (two) times a day with meals for 5 days (Patient not taking: Reported on 2/7/2020), Disp: 10 tablet, Rfl: 0    naproxen sodium (ANAPROX) 550 mg tablet, Take 550 mg by mouth, Disp: , Rfl:     Current Allergies     Allergies as of 02/20/2020    (No Known Allergies)            The following portions of the patient's history were reviewed and updated as appropriate: allergies, current medications, past family history, past medical history, past social history, past surgical history and problem list      Past Medical History:   Diagnosis Date    Anxiety     Asthma     Hypertension     off meds-stable    Murmur, heart     Seasonal allergies     Sinus problem     Tonsillitis        Past Surgical History:   Procedure Laterality Date    CHOLECYSTECTOMY      MD INCIS TENDON SHEATH,RADIAL STYLOID Right 10/17/2019    Procedure: RELEASE  RIGHT DEQUERVAINS;  Surgeon: Yoav Moreland MD;  [facility];  Service: Orthopedics    WISDOM TOOTH EXTRACTION         Family History   Problem Relation Age of Onset    Asthma Mother     COPD Mother     Eczema Mother     Emphysema Mother     Heart disease Father     Hypertension Father     Asthma Father     Alcohol abuse Father     Heart disease Paternal Aunt     Heart disease Paternal Uncle     Cancer Maternal Grandfather     Glaucoma Maternal Grandfather     Asthma Maternal Grandfather     Emphysema Maternal Grandfather     Cancer Paternal Grandfather     Hypertension Paternal Grandfather     Hypertension Maternal Grandmother     Emphysema Maternal Grandmother     Asthma Maternal Grandmother          Medications have been verified  Objective   Pulse 77   Temp 98 1 °F (36 7 °C)   Resp 20   Ht 5' 4" (1 626 m)   Wt (!) 150 kg (331 lb)   LMP 02/03/2020   SpO2 98%   BMI 56 82 kg/m²        Physical Exam     Physical Exam   Constitutional: She appears well-developed and well-nourished  HENT:   Hypertrophic boggy turbinates  Neck: Normal range of motion  Pulmonary/Chest: Effort normal  She has rales  Vitals reviewed

## 2020-02-21 NOTE — PATIENT INSTRUCTIONS
I prescribed fluticasone nasal spray-2 sprays in each nostril once daily, Zithromax Z-Boyd, Ventolin inhaler to use as needed  Acute Bronchitis   WHAT YOU NEED TO KNOW:   Acute bronchitis is swelling and irritation in the air passages of your lungs  This irritation may cause you to cough or have other breathing problems  Acute bronchitis often starts because of another illness, such as a cold or the flu  The illness spreads from your nose and throat to your windpipe and airways  Bronchitis is often called a chest cold  Acute bronchitis lasts about 3 to 6 weeks and is usually not a serious illness  Your cough can last for several weeks  DISCHARGE INSTRUCTIONS:   Return to the emergency department if:   · You cough up blood  · Your lips or fingernails turn blue  · You feel like you are not getting enough air when you breathe  Contact your healthcare provider if:   · You have a fever  · Your breathing problems do not go away or get worse  · Your cough does not get better within 4 weeks  · You have questions or concerns about your condition or care  Self-care:   · Get more rest   Rest helps your body to heal  Slowly start to do more each day  Rest when you feel it is needed  · Avoid irritants in the air  Avoid chemicals, fumes, and dust  Wear a face mask if you must work around dust or fumes  Stay inside on days when air pollution levels are high  If you have allergies, stay inside when pollen counts are high  Do not use aerosol products, such as spray-on deodorant, bug spray, and hair spray  · Do not smoke or be around others who smoke  Nicotine and other chemicals in cigarettes and cigars damages the cilia that move mucus out of your lungs  Ask your healthcare provider for information if you currently smoke and need help to quit  E-cigarettes or smokeless tobacco still contain nicotine  Talk to your healthcare provider before you use these products  · Drink liquids as directed  Liquids help keep your air passages moist and help you cough up mucus  You may need to drink more liquids when you have acute bronchitis  Ask how much liquid to drink each day and which liquids are best for you  · Use a humidifier or vaporizer  Use a cool mist humidifier or a vaporizer to increase air moisture in your home  This may make it easier for you to breathe and help decrease your cough  Decrease risk for acute bronchitis:   · Get the vaccinations you need  Ask your healthcare provider if you should get vaccinated against the flu or pneumonia  · Prevent the spread of germs  You can decrease your risk of acute bronchitis and other illnesses by doing the following:     [facility] your hands often with soap and water  Carry germ-killing hand lotion or gel with you  You can use the lotion or gel to clean your hands when soap and water are not available  ¨ Do not touch your eyes, nose, or mouth unless you have washed your hands first     ¨ Always cover your mouth when you cough to prevent the spread of germs  It is best to cough into a tissue or your shirt sleeve instead of into your hand  Ask those around you cover their mouths when they cough  ¨ Try to avoid people who have a cold or the flu  If you are sick, stay away from others as much as possible  Medicines: Your healthcare provider may  give you any of the following:  · Ibuprofen or acetaminophen  are medicines that help lower your fever  They are available without a doctor's order  Ask your healthcare provider which medicine is right for you  Ask how much to take and how often to take it  Follow directions  These medicines can cause stomach bleeding if not taken correctly  Ibuprofen can cause kidney damage  Do not take ibuprofen if you have kidney disease, an ulcer, or allergies to aspirin  Acetaminophen can cause liver damage  Do not take more than 4,000 milligrams in 24 hours       · Decongestants  help loosen mucus in your lungs and make it easier to cough up  This can help you breathe easier  · Cough suppressants  decrease your urge to cough  If your cough produces mucus, do not take a cough suppressant unless your healthcare provider tells you to  Your healthcare provider may suggest that you take a cough suppressant at night so you can rest     · Inhalers  may be given  Your healthcare provider may give you one or more inhalers to help you breathe easier and cough less  An inhaler gives your medicine to open your airways  Ask your healthcare provider to show you how to use your inhaler correctly  · Take your medicine as directed  Contact your healthcare provider if you think your medicine is not helping or if you have side effects  Tell him of her if you are allergic to any medicine  Keep a list of the medicines, vitamins, and herbs you take  Include the amounts, and when and why you take them  Bring the list or the pill bottles to follow-up visits  Carry your medicine list with you in case of an emergency  Follow up with your healthcare provider as directed:  Write down questions you have so you will remember to ask them during your follow-up visits  © 2017 [address] [employer] Information is for End User's use only and may not be sold, redistributed or otherwise used for commercial purposes  All illustrations and images included in CareNotes® are the copyrighted property of [employer]  or [employer]  The above information is an  only  It is not intended as medical advice for individual conditions or treatments  Talk to your doctor, nurse or pharmacist before following any medical regimen to see if it is safe and effective for you

## 2020-02-27 ENCOUNTER — OFFICE VISIT (OUTPATIENT)
Dept: FAMILY MEDICINE CLINIC | Facility: CLINIC | Age: 40
End: 2020-02-27
Payer: COMMERCIAL

## 2020-02-27 VITALS
WEIGHT: 293 LBS | DIASTOLIC BLOOD PRESSURE: 100 MMHG | TEMPERATURE: 100.5 F | SYSTOLIC BLOOD PRESSURE: 148 MMHG | OXYGEN SATURATION: 94 % | BODY MASS INDEX: 50.02 KG/M2 | HEIGHT: 64 IN | HEART RATE: 83 BPM

## 2020-02-27 DIAGNOSIS — J30.1 SEASONAL ALLERGIC RHINITIS DUE TO POLLEN: ICD-10-CM

## 2020-02-27 DIAGNOSIS — I10 BENIGN ESSENTIAL HYPERTENSION: ICD-10-CM

## 2020-02-27 DIAGNOSIS — J20.9 ACUTE BRONCHITIS, UNSPECIFIED ORGANISM: Primary | ICD-10-CM

## 2020-02-27 DIAGNOSIS — E66.01 MORBID OBESITY (HCC): ICD-10-CM

## 2020-02-27 DIAGNOSIS — G35 MULTIPLE SCLEROSIS (HCC): ICD-10-CM

## 2020-02-27 PROCEDURE — 3077F SYST BP >= 140 MM HG: CPT | Performed by: INTERNAL MEDICINE

## 2020-02-27 PROCEDURE — 3008F BODY MASS INDEX DOCD: CPT | Performed by: INTERNAL MEDICINE

## 2020-02-27 PROCEDURE — 1036F TOBACCO NON-USER: CPT | Performed by: INTERNAL MEDICINE

## 2020-02-27 PROCEDURE — 99214 OFFICE O/P EST MOD 30 MIN: CPT | Performed by: INTERNAL MEDICINE

## 2020-02-27 PROCEDURE — 3080F DIAST BP >= 90 MM HG: CPT | Performed by: INTERNAL MEDICINE

## 2020-02-27 RX ORDER — BUDESONIDE AND FORMOTEROL FUMARATE DIHYDRATE 80; 4.5 UG/1; UG/1
2 AEROSOL RESPIRATORY (INHALATION) 2 TIMES DAILY
Qty: 1 INHALER | Refills: 2 | Status: SHIPPED | OUTPATIENT
Start: 2020-02-27

## 2020-02-27 RX ORDER — BENZONATATE 200 MG/1
200 CAPSULE ORAL 3 TIMES DAILY PRN
Qty: 40 CAPSULE | Refills: 1 | Status: SHIPPED | OUTPATIENT
Start: 2020-02-27 | End: 2020-08-17 | Stop reason: ALTCHOICE

## 2020-02-27 NOTE — PROGRESS NOTES
Assessment/Plan:  Patient appears to have had an episode of bronchitis at the end of January which had improved but had an extended cough  A week ago it appears that she had a new illness and was treated for acute bronchitis with a Z-Boyd and albuterol inhaler  She continues to cough in paroxysms and now has a fever in addition  She will get a chest x-ray and laboratory studies including mycoplasma pertussis PCR CBC CMP, all to be done stat  Should be given Racquel Christine and since she responded to the albuterol she will be given Symbicort 1-2 puffs twice daily  She prefers to hold off on another round of antibiotics especially since her Z-Boyd is still active but if she still has a fever in a few days she could call for prescription for for doxycycline if the chest x-ray is normal   If her chest x-ray shows an infiltrate she will need antibiotic therapy  She is large in size and morbidly obese and if her chest x-rays inconclusive in she continues to be sick she may require CT scanning without contrast   It was recommended that she return in 12-14 days otherwise    Diet reviewed  Lifestyle modifications reviewed  Medications reviewed and ordered  Laboratory tests and studies reviewed and ordered  All patient's questions answered to patient satisfaction  Diagnoses and all orders for this visit:    Acute bronchitis, unspecified organism  -     XR chest pa & lateral; Future  -     Bordetella pertussis / parapertussis PCR; Future  -     Respiratory Pathogen Profile, PCR; Future  -     Mycoplasma Pneumoniae AB, IgG/IgM; Future  -     CBC and differential; Future  -     Comprehensive metabolic panel; Future  -     benzonatate (TESSALON) 200 MG capsule; Take 1 capsule (200 mg total) by mouth 3 (three) times a day as needed for cough  -     budesonide-formoterol (SYMBICORT) 80-4 5 MCG/ACT inhaler; Inhale 2 puffs 2 (two) times a day Rinse mouth after use      Seasonal allergic rhinitis due to pollen    Benign essential hypertension    Multiple sclerosis ([facility] [address] )    Morbid obesity [facility])        Chief Complaint   Patient presents with    Cold Like Symptoms         Subjective: This 44-year-old female is here for a 7 day history cough which is minimally productive and occurs in paroxysms  She has a fever today in the office of [facility] 5  She has not noted fever at home but has been taking naproxen  She does not have muscle aches  She was seen in the walk-in week ago and was given albuterol inhaler and a Z-Boyd  She notes improvement after the albuterol inhaler for a couple of hours  She is now finished the Z-Boyd  She wishes to avoid further antibiotics for now unless absolutely necessary  This illnesses superimposed on an episode of bronchitis that she had this started in late January with a persistent cough since then that has continued up until this illness  The patient has known MS and is on Copaxone without any progression of her MS noted  Patient has known hypertension and has white coat hypertension is not unusual for her blood pressure to be elevated in the doctor's office  The patient continues to be morbidly obese  She denies significant symptomatic lowering go esophageal reflux  Patient ID: Yue Baker is a 46 y o  female          Current Outpatient Medications:     albuterol (PROVENTIL HFA,VENTOLIN HFA) 90 mcg/act inhaler, Inhale 2 puffs every 6 (six) hours as needed for wheezing, Disp: 1 Inhaler, Rfl: 0    COPAXONE 40 MG/ML SOSY, Inject 1 mL (40 mg total) under the skin 3 (three) times a week, Disp: 12 Syringe, Rfl: 6    fluticasone (FLONASE) 50 mcg/act nasal spray, 2 sprays into each nostril daily, Disp: 16 g, Rfl: 0    loratadine (CLARITIN) 10 mg tablet, Take 10 mg by mouth daily, Disp: , Rfl:     NON FORMULARY, , Disp: , Rfl:     benzonatate (TESSALON) 200 MG capsule, Take 1 capsule (200 mg total) by mouth 3 (three) times a day as needed for cough, Disp: 40 capsule, Rfl: 1   budesonide-formoterol (SYMBICORT) 80-4 5 MCG/ACT inhaler, Inhale 2 puffs 2 (two) times a day Rinse mouth after use , Disp: 1 Inhaler, Rfl: 2    naproxen sodium (ANAPROX) 550 mg tablet, Take 550 mg by mouth, Disp: , Rfl:     The following portions of the patient's history were reviewed and updated as appropriate: allergies, current medications, past family history, past medical history, past social history, past surgical history and problem list     Review of Systems   Constitutional: Positive for fever  Respiratory: Positive for cough            Family History   Problem Relation Age of Onset    Asthma Mother     COPD Mother     Eczema Mother     Emphysema Mother     Heart disease Father    [place] Hypertension Father     Asthma Father     Alcohol abuse Father     Heart disease Paternal Aunt     Heart disease Paternal Uncle     Cancer Maternal Grandfather     Glaucoma Maternal Grandfather     Asthma Maternal Grandfather     Emphysema Maternal Grandfather     Cancer Paternal Grandfather     Hypertension Paternal Grandfather     Hypertension Maternal Grandmother     Emphysema Maternal Grandmother     Asthma Maternal Grandmother        Past Medical History:   Diagnosis Date    Anxiety     Asthma     Hypertension     off meds-stable    Murmur, heart     Seasonal allergies     Sinus problem     Tonsillitis        Past Surgical History:   Procedure Laterality Date    CHOLECYSTECTOMY      NE INCIS TENDON SHEATH,RADIAL STYLOID Right 10/17/2019    Procedure: RELEASE  RIGHT Michae Balzarine;  Surgeon: Yoav Moreland MD;  Location: [facility] OR;  Service: Orthopedics    WISDOM TOOTH EXTRACTION         Social History     Socioeconomic History    Marital status: /Civil Union     Spouse name: None    Number of children: None    Years of education: None    Highest education level: None   Occupational History    None   Social Needs    Financial resource strain: None    Food insecurity:     Worry: None     Inability: None    Transportation needs:     Medical: None     Non-medical: None   Tobacco Use    Smoking status: Never Smoker    Smokeless tobacco: Never Used   Substance and Sexual Activity    Alcohol use: Yes     Frequency: Monthly or less     Drinks per session: 1 or 2     Binge frequency: Never    Drug use: Yes     Types: Marijuana     Comment: medical marijuana    Sexual activity: Yes     Partners: Male   Lifestyle    Physical activity:     Days per week: None     Minutes per session: None    Stress: None   Relationships    Social connections:     Talks on phone: None     Gets together: None     Attends Ten[employer] service: None     Active member of club or organization: None     Attends meetings of clubs or organizations: None     Relationship status: None    Intimate partner violence:     Fear of current or ex partner: None     Emotionally abused: None     Physically abused: None     Forced sexual activity: None   Other Topics Concern    None   Social History Narrative    None       No Known Allergies      Objective:    /100 (BP Location: Left arm, Patient Position: Sitting, Cuff Size: Extra-Large)   Pulse 83   Temp 100 5 °F (38 1 °C)   Ht 5' 4" (1 626 m)   Wt (!) 148 kg (327 lb)   LMP 02/03/2020   SpO2 94%   BMI 56 13 kg/m²        Physical Exam   Constitutional: She is oriented to person, place, and time  She appears well-developed and well-nourished  No distress  HENT:   Head: Normocephalic and atraumatic  Nose: Nose normal    Mouth/Throat: Oropharynx is clear and moist  No oropharyngeal exudate  Eyes: Pupils are equal, round, and reactive to light  Conjunctivae and EOM are normal  No scleral icterus  Neck: Normal range of motion  Neck supple  No JVD present  No tracheal deviation present  No thyromegaly present  Cardiovascular: Normal rate, regular rhythm and normal heart sounds  Exam reveals no gallop and no friction rub  No murmur heard    Pulmonary/Chest: Effort normal and breath sounds normal  No respiratory distress  She has no wheezes  She has no rales  She exhibits no tenderness  Abdominal: Soft  Bowel sounds are normal  She exhibits no distension and no mass  There is no tenderness  There is no rebound and no guarding  Musculoskeletal: Normal range of motion  She exhibits no edema or deformity  Lymphadenopathy:     She has no cervical adenopathy  Neurological: She is alert and oriented to person, place, and time  No cranial nerve deficit  Coordination normal    Skin: Skin is warm and dry  No rash noted  Psychiatric: She has a normal mood and affect   Her behavior is normal  Judgment and thought content normal

## 2020-02-28 ENCOUNTER — APPOINTMENT (OUTPATIENT)
Dept: LAB | Facility: MEDICAL CENTER | Age: 40
End: 2020-02-28
Payer: COMMERCIAL

## 2020-02-28 DIAGNOSIS — J20.9 ACUTE BRONCHITIS, UNSPECIFIED ORGANISM: ICD-10-CM

## 2020-02-28 LAB
ALBUMIN SERPL BCP-MCNC: 3.6 G/DL (ref 3.5–5)
ALP SERPL-CCNC: 99 U/L (ref 46–116)
ALT SERPL W P-5'-P-CCNC: 21 U/L (ref 12–78)
ANION GAP SERPL CALCULATED.3IONS-SCNC: 7 MMOL/L (ref 4–13)
AST SERPL W P-5'-P-CCNC: 16 U/L (ref 5–45)
BASOPHILS # BLD AUTO: 0.04 THOUSANDS/ΜL (ref 0–0.1)
BASOPHILS NFR BLD AUTO: 1 % (ref 0–1)
BILIRUB SERPL-MCNC: 0.35 MG/DL (ref 0.2–1)
BUN SERPL-MCNC: 16 MG/DL (ref 5–25)
CALCIUM SERPL-MCNC: 8.7 MG/DL (ref 8.3–10.1)
CHLORIDE SERPL-SCNC: 106 MMOL/L (ref 100–108)
CO2 SERPL-SCNC: 25 MMOL/L (ref 21–32)
CREAT SERPL-MCNC: 0.65 MG/DL (ref 0.6–1.3)
EOSINOPHIL # BLD AUTO: 0.19 THOUSAND/ΜL (ref 0–0.61)
EOSINOPHIL NFR BLD AUTO: 2 % (ref 0–6)
ERYTHROCYTE [DISTWIDTH] IN BLOOD BY AUTOMATED COUNT: 14.1 % (ref 11.6–15.1)
GFR SERPL CREATININE-BSD FRML MDRD: 113 ML/MIN/1.73SQ M
GLUCOSE P FAST SERPL-MCNC: 88 MG/DL (ref 65–99)
HCT VFR BLD AUTO: 41.5 % (ref 34.8–46.1)
HGB BLD-MCNC: 12.9 G/DL (ref 11.5–15.4)
IMM GRANULOCYTES # BLD AUTO: 0.01 THOUSAND/UL (ref 0–0.2)
IMM GRANULOCYTES NFR BLD AUTO: 0 % (ref 0–2)
LYMPHOCYTES # BLD AUTO: 2.51 THOUSANDS/ΜL (ref 0.6–4.47)
LYMPHOCYTES NFR BLD AUTO: 32 % (ref 14–44)
MCH RBC QN AUTO: 27.3 PG (ref 26.8–34.3)
MCHC RBC AUTO-ENTMCNC: 31.1 G/DL (ref 31.4–37.4)
MCV RBC AUTO: 88 FL (ref 82–98)
MONOCYTES # BLD AUTO: 0.61 THOUSAND/ΜL (ref 0.17–1.22)
MONOCYTES NFR BLD AUTO: 8 % (ref 4–12)
NEUTROPHILS # BLD AUTO: 4.59 THOUSANDS/ΜL (ref 1.85–7.62)
NEUTS SEG NFR BLD AUTO: 57 % (ref 43–75)
NRBC BLD AUTO-RTO: 0 /100 WBCS
PLATELET # BLD AUTO: 256 THOUSANDS/UL (ref 149–390)
PMV BLD AUTO: 11.6 FL (ref 8.9–12.7)
POTASSIUM SERPL-SCNC: 4.3 MMOL/L (ref 3.5–5.3)
PROT SERPL-MCNC: 7.6 G/DL (ref 6.4–8.2)
RBC # BLD AUTO: 4.72 MILLION/UL (ref 3.81–5.12)
SODIUM SERPL-SCNC: 138 MMOL/L (ref 136–145)
WBC # BLD AUTO: 7.95 THOUSAND/UL (ref 4.31–10.16)

## 2020-02-28 PROCEDURE — 80053 COMPREHEN METABOLIC PANEL: CPT

## 2020-02-28 PROCEDURE — 36415 COLL VENOUS BLD VENIPUNCTURE: CPT

## 2020-02-28 PROCEDURE — 85025 COMPLETE CBC W/AUTO DIFF WBC: CPT

## 2020-02-28 PROCEDURE — 86738 MYCOPLASMA ANTIBODY: CPT

## 2020-03-02 LAB
M PNEUMO IGG SER IA-ACNC: 1979 U/ML (ref 0–99)
M PNEUMO IGM SER IA-ACNC: <770 U/ML (ref 0–769)

## 2020-03-05 ENCOUNTER — OFFICE VISIT (OUTPATIENT)
Dept: FAMILY MEDICINE CLINIC | Facility: CLINIC | Age: 40
End: 2020-03-05
Payer: COMMERCIAL

## 2020-03-05 ENCOUNTER — TELEPHONE (OUTPATIENT)
Dept: FAMILY MEDICINE CLINIC | Facility: CLINIC | Age: 40
End: 2020-03-05

## 2020-03-05 VITALS
HEIGHT: 64 IN | WEIGHT: 293 LBS | DIASTOLIC BLOOD PRESSURE: 80 MMHG | HEART RATE: 89 BPM | SYSTOLIC BLOOD PRESSURE: 138 MMHG | OXYGEN SATURATION: 95 % | BODY MASS INDEX: 50.02 KG/M2 | TEMPERATURE: 98.9 F

## 2020-03-05 DIAGNOSIS — G35 MULTIPLE SCLEROSIS (HCC): ICD-10-CM

## 2020-03-05 DIAGNOSIS — J20.9 BRONCHOSPASM WITH BRONCHITIS, ACUTE: ICD-10-CM

## 2020-03-05 DIAGNOSIS — J20.9 ACUTE BRONCHITIS, UNSPECIFIED ORGANISM: Primary | ICD-10-CM

## 2020-03-05 PROCEDURE — 3008F BODY MASS INDEX DOCD: CPT | Performed by: PSYCHIATRY & NEUROLOGY

## 2020-03-05 PROCEDURE — 1036F TOBACCO NON-USER: CPT | Performed by: INTERNAL MEDICINE

## 2020-03-05 PROCEDURE — 3075F SYST BP GE 130 - 139MM HG: CPT | Performed by: INTERNAL MEDICINE

## 2020-03-05 PROCEDURE — 3008F BODY MASS INDEX DOCD: CPT | Performed by: INTERNAL MEDICINE

## 2020-03-05 PROCEDURE — 3079F DIAST BP 80-89 MM HG: CPT | Performed by: INTERNAL MEDICINE

## 2020-03-05 PROCEDURE — 99214 OFFICE O/P EST MOD 30 MIN: CPT | Performed by: INTERNAL MEDICINE

## 2020-03-05 RX ORDER — AMOXICILLIN AND CLAVULANATE POTASSIUM 875; 125 MG/1; MG/1
1 TABLET, FILM COATED ORAL EVERY 12 HOURS SCHEDULED
Qty: 14 TABLET | Refills: 0 | Status: SHIPPED | OUTPATIENT
Start: 2020-03-05 | End: 2020-03-12

## 2020-03-05 RX ORDER — PREDNISONE 20 MG/1
TABLET ORAL
Qty: 10 TABLET | Refills: 0 | Status: SHIPPED | OUTPATIENT
Start: 2020-03-05 | End: 2020-08-17 | Stop reason: ALTCHOICE

## 2020-03-05 RX ORDER — BENZONATATE 200 MG/1
CAPSULE ORAL
COMMUNITY
Start: 2020-02-27 | End: 2020-08-17 | Stop reason: ALTCHOICE

## 2020-03-05 NOTE — PROGRESS NOTES
Assessment/Plan:         Diagnoses and all orders for this visit:    Acute bronchitis, unspecified organism  -     XR chest pa & lateral; Future  -     amoxicillin-clavulanate (Augmentin) 875-125 mg per tablet; Take 1 tablet by mouth every 12 (twelve) hours for 7 days  Increase fluid intake, rest, saline gargles, tylenol as tolerated  Follow up if symptoms getting worse or seek immediate medical help for emergency  Pt understands the plan  Bronchospasm with bronchitis, acute  Minimal improvement with nebulizer treatment  Will start on short tapering dose of prednisone  Multiple sclerosis (HCC)        Subjective:      Patient ID: Christy Thomson is a 46 y o  female  Cough   This is a new problem  The current episode started more than 1 month ago  The problem has been waxing and waning  The cough is productive of sputum  Associated symptoms include nasal congestion, postnasal drip and shortness of breath  Pertinent negatives include no chills, fever or sore throat  Patient was in the urgent care about 2 weeks ago and was prescribed Z-Boyd  She has finished without much improvement of her symptoms  She had a chest x-ray was negative for pneumonia  She was prescribed Symbicort which she has not been taking regularly  Patient has MS and is on copaxone  Does not report worsening of her weakness  The following portions of the patient's history were reviewed and updated as appropriate: allergies, current medications, past medical history, past social history and problem list     Review of Systems   Constitutional: Negative for chills and fever  HENT: Positive for postnasal drip  Negative for sore throat  Respiratory: Positive for cough and shortness of breath            Objective:      /80 (BP Location: Left arm, Patient Position: Sitting, Cuff Size: Standard)   Pulse 89   Temp 98 9 °F (37 2 °C)   Ht 5' 4" (1 626 m)   Wt (!) 151 kg (333 lb 12 8 oz)   SpO2 95%   BMI 57 30 kg/m² Physical Exam   Cardiovascular: Normal rate, regular rhythm and normal heart sounds  No murmur heard  Pulmonary/Chest: Effort normal  No stridor  No respiratory distress  She has wheezes

## 2020-03-12 DIAGNOSIS — J40 BRONCHITIS: ICD-10-CM

## 2020-03-12 DIAGNOSIS — J06.9 ACUTE URI: ICD-10-CM

## 2020-03-12 DIAGNOSIS — J30.1 SEASONAL ALLERGIC RHINITIS DUE TO POLLEN: Primary | ICD-10-CM

## 2020-03-12 RX ORDER — FLUTICASONE PROPIONATE 50 MCG
SPRAY, SUSPENSION (ML) NASAL
Qty: 16 ML | Refills: 0 | OUTPATIENT
Start: 2020-03-12

## 2020-03-12 RX ORDER — FLUTICASONE PROPIONATE 50 MCG
1 SPRAY, SUSPENSION (ML) NASAL DAILY
Qty: 1 BOTTLE | Refills: 3 | Status: SHIPPED | OUTPATIENT
Start: 2020-03-12

## 2020-05-05 DIAGNOSIS — G35 MULTIPLE SCLEROSIS (HCC): ICD-10-CM

## 2020-05-05 RX ORDER — GLATIRAMER ACETATE 40 MG/ML
INJECTION, SOLUTION SUBCUTANEOUS
Qty: 12 ML | Refills: 5 | Status: SHIPPED | OUTPATIENT
Start: 2020-05-05 | End: 2020-10-07 | Stop reason: SDUPTHER

## 2020-06-12 ENCOUNTER — TELEPHONE (OUTPATIENT)
Dept: FAMILY MEDICINE CLINIC | Facility: CLINIC | Age: 40
End: 2020-06-12

## 2020-07-28 ENCOUNTER — TELEPHONE (OUTPATIENT)
Dept: NEUROLOGY | Facility: CLINIC | Age: 40
End: 2020-07-28

## 2020-07-28 NOTE — TELEPHONE ENCOUNTER
Left message for patient to call the office regarding appointment on 8/10/20    Dr Jerrell Mcgill will be out of the office and we can change to 8/17/20 at 11:35 am   Please confirm with patient and document

## 2020-08-05 DIAGNOSIS — G35 MULTIPLE SCLEROSIS (HCC): Primary | ICD-10-CM

## 2020-08-05 NOTE — TELEPHONE ENCOUNTER
Pt confirmed appt scheduled on 8/17/20 at 11:35  Pt is asking if she needs to get bw done prior to her appt?            [phone] ok to leave detailed message

## 2020-08-05 NOTE — TELEPHONE ENCOUNTER
Patient aware and she will be going to our lab so she does not need the script    Reconfirmed the appointment for 8/17/20

## 2020-08-06 ENCOUNTER — TELEPHONE (OUTPATIENT)
Dept: FAMILY MEDICINE CLINIC | Facility: CLINIC | Age: 40
End: 2020-08-06

## 2020-08-06 DIAGNOSIS — Z20.822 COVID-19 RULED OUT BY LABORATORY TESTING: Primary | ICD-10-CM

## 2020-08-06 NOTE — TELEPHONE ENCOUNTER
Patient wants a order for COVID antibodies test  To see if she has immunity  Can you please order test for her?     Then let her know she can get it done, thanks

## 2020-08-13 ENCOUNTER — APPOINTMENT (OUTPATIENT)
Dept: LAB | Facility: MEDICAL CENTER | Age: 40
End: 2020-08-13
Payer: COMMERCIAL

## 2020-08-13 DIAGNOSIS — Z20.822 COVID-19 RULED OUT BY LABORATORY TESTING: ICD-10-CM

## 2020-08-13 DIAGNOSIS — G35 MULTIPLE SCLEROSIS (HCC): ICD-10-CM

## 2020-08-13 LAB
ALBUMIN SERPL BCP-MCNC: 3.7 G/DL (ref 3.5–5)
ALP SERPL-CCNC: 97 U/L (ref 46–116)
ALT SERPL W P-5'-P-CCNC: 26 U/L (ref 12–78)
ANION GAP SERPL CALCULATED.3IONS-SCNC: 8 MMOL/L (ref 4–13)
AST SERPL W P-5'-P-CCNC: 18 U/L (ref 5–45)
BASOPHILS # BLD AUTO: 0.04 THOUSANDS/ΜL (ref 0–0.1)
BASOPHILS NFR BLD AUTO: 1 % (ref 0–1)
BILIRUB SERPL-MCNC: 0.4 MG/DL (ref 0.2–1)
BUN SERPL-MCNC: 12 MG/DL (ref 5–25)
CALCIUM SERPL-MCNC: 9.8 MG/DL (ref 8.3–10.1)
CHLORIDE SERPL-SCNC: 106 MMOL/L (ref 100–108)
CO2 SERPL-SCNC: 28 MMOL/L (ref 21–32)
CREAT SERPL-MCNC: 0.8 MG/DL (ref 0.6–1.3)
EOSINOPHIL # BLD AUTO: 0.16 THOUSAND/ΜL (ref 0–0.61)
EOSINOPHIL NFR BLD AUTO: 2 % (ref 0–6)
ERYTHROCYTE [DISTWIDTH] IN BLOOD BY AUTOMATED COUNT: 13.9 % (ref 11.6–15.1)
GFR SERPL CREATININE-BSD FRML MDRD: 93 ML/MIN/1.73SQ M
GLUCOSE P FAST SERPL-MCNC: 97 MG/DL (ref 65–99)
HCT VFR BLD AUTO: 43.4 % (ref 34.8–46.1)
HGB BLD-MCNC: 13.5 G/DL (ref 11.5–15.4)
IMM GRANULOCYTES # BLD AUTO: 0.02 THOUSAND/UL (ref 0–0.2)
IMM GRANULOCYTES NFR BLD AUTO: 0 % (ref 0–2)
LYMPHOCYTES # BLD AUTO: 2.73 THOUSANDS/ΜL (ref 0.6–4.47)
LYMPHOCYTES NFR BLD AUTO: 37 % (ref 14–44)
MCH RBC QN AUTO: 27.7 PG (ref 26.8–34.3)
MCHC RBC AUTO-ENTMCNC: 31.1 G/DL (ref 31.4–37.4)
MCV RBC AUTO: 89 FL (ref 82–98)
MONOCYTES # BLD AUTO: 0.47 THOUSAND/ΜL (ref 0.17–1.22)
MONOCYTES NFR BLD AUTO: 6 % (ref 4–12)
NEUTROPHILS # BLD AUTO: 4.02 THOUSANDS/ΜL (ref 1.85–7.62)
NEUTS SEG NFR BLD AUTO: 54 % (ref 43–75)
NRBC BLD AUTO-RTO: 0 /100 WBCS
PLATELET # BLD AUTO: 290 THOUSANDS/UL (ref 149–390)
PMV BLD AUTO: 11.7 FL (ref 8.9–12.7)
POTASSIUM SERPL-SCNC: 4.1 MMOL/L (ref 3.5–5.3)
PROT SERPL-MCNC: 7.4 G/DL (ref 6.4–8.2)
RBC # BLD AUTO: 4.87 MILLION/UL (ref 3.81–5.12)
SARS-COV-2 IGG+IGM SERPL QL IA: NORMAL
SODIUM SERPL-SCNC: 142 MMOL/L (ref 136–145)
WBC # BLD AUTO: 7.44 THOUSAND/UL (ref 4.31–10.16)

## 2020-08-13 PROCEDURE — 36415 COLL VENOUS BLD VENIPUNCTURE: CPT

## 2020-08-13 PROCEDURE — 80053 COMPREHEN METABOLIC PANEL: CPT

## 2020-08-13 PROCEDURE — 86769 SARS-COV-2 COVID-19 ANTIBODY: CPT

## 2020-08-13 PROCEDURE — 85025 COMPLETE CBC W/AUTO DIFF WBC: CPT

## 2020-08-17 ENCOUNTER — OFFICE VISIT (OUTPATIENT)
Dept: NEUROLOGY | Facility: CLINIC | Age: 40
End: 2020-08-17
Payer: COMMERCIAL

## 2020-08-17 VITALS
HEART RATE: 90 BPM | DIASTOLIC BLOOD PRESSURE: 82 MMHG | BODY MASS INDEX: 59.84 KG/M2 | WEIGHT: 293 LBS | SYSTOLIC BLOOD PRESSURE: 136 MMHG | TEMPERATURE: 97.6 F

## 2020-08-17 DIAGNOSIS — G35 MULTIPLE SCLEROSIS (HCC): Primary | ICD-10-CM

## 2020-08-17 PROCEDURE — 3079F DIAST BP 80-89 MM HG: CPT | Performed by: PSYCHIATRY & NEUROLOGY

## 2020-08-17 PROCEDURE — 99214 OFFICE O/P EST MOD 30 MIN: CPT | Performed by: PSYCHIATRY & NEUROLOGY

## 2020-08-17 PROCEDURE — 1036F TOBACCO NON-USER: CPT | Performed by: PSYCHIATRY & NEUROLOGY

## 2020-08-17 PROCEDURE — 3075F SYST BP GE 130 - 139MM HG: CPT | Performed by: PSYCHIATRY & NEUROLOGY

## 2020-08-17 RX ORDER — IBUPROFEN 600 MG/1
TABLET ORAL
COMMUNITY
Start: 2020-05-14

## 2020-08-17 NOTE — PROGRESS NOTES
Patient ID: Elijah Kramer is a 45 y o  female  Assessment/Plan:    Multiple sclerosis (HCC)  Pt here for neuro followu p  Last seen in feb for her MS  Pt has recovered from uri sxs in the winter  Pt had covid ab testing and neg  Pt working thru pandemic  Pt notes no close covid contacts  Pt well aware of cdc recommendations in setting of covid  Pt exam stable  Pt remains on copaxone brand tiw  matt med well  Labs from 8/13 good  Pt to call for any new sxs       Diagnoses and all orders for this visit:    Multiple sclerosis ([address] )  -     CBC and differential; Future  -     Hepatic function panel;  Future    Other orders  -     ibuprofen (MOTRIN) 600 mg tablet           Subjective:    HPI    Pt here for neuro follow up   Pt last seen on 2/7/20 with me   Per my last note "Patient initially presented with right visual field disturbance in early November 2017   At time of presentation, patient noted needing to turn her head to see full picture on the right side to see her colleague who was sitting on the right side of her   MRI brain 11/24/17 with multiple supra and infratentorial lesions with varying degrees of enhancement and others nonenhancing   Lesions noted in the left cerebellar hemisphere measuring 9 mm with rim enhancement   Multiple supratentorial lesions and periventricular in location   Some of the periventricular lesions have a perpendicular orientation to the body of the lateral ventricle   The largest is in the right temporal parietal area all 11 mm with partial enhancement of the lesion  [place] is a 5 mm enhancing lesion within the occipital horn of the lateral ventricle   MRI cervical spine and MRI thoracic spine were completed in December 2017   No cord lesions found on either study   Patient had a lumbar puncture done as well   Negative Gram stain, negative Lyme PC R  MS panel with positive oligoclonal bands of 4 (4 oligoclonal bands in the CSF only, in addition to 4 paired bands in both CSF and serum), elevated myelin basic protein 2 5 and elevated IgG synthesis rate of 5 3   Negative CSF NMO   Negative CSF ACE   Viral comprehensive PCR negative  Due to positive LP as well as multiple enhancing and nonenhancing lesions seen on MRI brain, diagnosis of MS was made  Brigitte Ferreira was JCV negative with index 0 09  Patient chose to start Copaxone as her 1st IMD therapy   "     Kept above paragraph due to complexities of pt case  pt last seen in feb  In winter pt had uri sxs accompanied with cough as well  Pt now had covid ab testing done and neg  Pt was quite ill at that time but has since recovered and unfortunately no covid testing at that time  Pt notes no new ms sxs at this ttime  Pt denies any new sxs  No falls or trips  No change in bowel or bladder  No LOC, no seizure  No change in speech or swallowing  No change in vision  No loss of vision  No diplopia  No loss of vision  No headache, no nausea or vomiting  No recent hospitalizations  No recent infections  Pt notes no neck pain  No radicular sxs  No myelopathic sxs  Pt notes overall feels well  Pt has been very busy and working live at work all during covid  Pt notes she is very aware of cdc recommendations in setting of pandemic  Pt notes no fever or chills  No cough or sob  No close contacts  Pt had updated labs in 8/13/20 with nl wbc, abs lymphs and abs neutrophils   gfr good at 93 and lfts normal   Pt doing well with her brand copaxone tiw  Pt matt med well  No site injection or side effect issues  Pt very compliant with her meds  Rev last imaging from nov 2019 and stable mri head and c spine at that time                   The following portions of the patient's history were reviewed and updated as appropriate: allergies, current medications, past family history, past medical history, past social history, past surgical history and problem list and med rec and ros rev           Objective:    Blood pressure 136/82, pulse 90, temperature 97 6 °F (36 4 °C), weight (!) 158 kg (348 lb 9 6 oz)  Physical Exam  Constitutional:       General: She is not in acute distress  Appearance: Normal appearance  She is not ill-appearing  Eyes:      General: Lids are normal       Extraocular Movements: Extraocular movements intact  Pupils: Pupils are equal, round, and reactive to light  Musculoskeletal: Normal range of motion  Neurological:      Mental Status: She is alert  Deep Tendon Reflexes: Strength normal and reflexes are normal and symmetric  Psychiatric:         Speech: Speech normal          Neurological Exam  Mental Status  Alert  Oriented to person, place and time  Recent and remote memory are intact  Speech is normal  Language is fluent with no aphasia  Attention and concentration are normal  Fund of knowledge is appropriate for level of education  Cranial Nerves  CN II: Visual acuity is normal  Visual fields full to confrontation  Right funduscopic exam: disc intact  Left funduscopic exam: disc intact  CN III, IV, VI: Extraocular movements intact bilaterally  Normal lids and orbits bilaterally  Pupils equal round and reactive to light bilaterally  CN V: Facial sensation is normal   CN VII: Full and symmetric facial movement  CN VIII: Hearing is normal   CN IX, X: Palate elevates symmetrically  Normal gag reflex  CN XI: Shoulder shrug strength is normal   CN XII: Tongue midline without atrophy or fasciculations  Motor  Normal muscle bulk throughout  Normal muscle tone  No abnormal involuntary movements  Strength is 5/5 throughout all four extremities  Sensory  Sensation is intact to light touch, pinprick, vibration and proprioception in all four extremities  Reflexes  Deep tendon reflexes are 2+ and symmetric in all four extremities with downgoing toes bilaterally      Coordination  Right: Finger-to-nose normal  Rapid alternating movement normal   Left: Finger-to-nose normal  Rapid alternating movement normal     Gait  Casual gait is normal including stance, stride, and arm swing  ROS:    Review of Systems   Constitutional: Negative  Negative for appetite change and fever  HENT: Negative  Negative for hearing loss, tinnitus, trouble swallowing and voice change  Eyes: Negative  Negative for photophobia and pain  Respiratory: Negative  Negative for shortness of breath  Cardiovascular: Negative  Negative for palpitations  Gastrointestinal: Negative  Negative for nausea and vomiting  Endocrine: Negative  Negative for cold intolerance  Genitourinary: Negative  Negative for dysuria, frequency and urgency  Musculoskeletal: Negative  Negative for myalgias and neck pain  Skin: Negative  Negative for rash  Neurological: Negative  Negative for dizziness, tremors, seizures, syncope, facial asymmetry, speech difficulty, weakness, light-headedness, numbness and headaches  Hematological: Negative  Does not bruise/bleed easily  Psychiatric/Behavioral: Negative  Negative for confusion, hallucinations and sleep disturbance

## 2020-10-02 NOTE — ASSESSMENT & PLAN NOTE
Pt here for neuro follow up  Pt overall doing very well  No new sxs  Pt just had tendon surgery right wrist less than one week ago and recovering well  No recent hospititalizations  Pt had one gi virus for 24 hours since last visit  Pt had labs done in Oct with normal lfts and cbc diff  Will update mri head and cspine to ensure efficacy of her copaxone  Pt matt copaxone well  No change in neuro exam No

## 2020-10-07 DIAGNOSIS — G35 MULTIPLE SCLEROSIS (HCC): ICD-10-CM

## 2020-10-07 RX ORDER — GLATIRAMER ACETATE 40 MG/ML
INJECTION, SOLUTION SUBCUTANEOUS
Qty: 12 ML | Refills: 5 | Status: SHIPPED | OUTPATIENT
Start: 2020-10-07 | End: 2021-02-25 | Stop reason: SDUPTHER

## 2020-12-01 ENCOUNTER — AMB VIDEO VISIT (OUTPATIENT)
Dept: OTHER | Facility: HOSPITAL | Age: 40
End: 2020-12-01

## 2020-12-01 PROCEDURE — EVISIT: Performed by: FAMILY MEDICINE

## 2020-12-10 ENCOUNTER — TELEPHONE (OUTPATIENT)
Dept: NEUROLOGY | Facility: CLINIC | Age: 40
End: 2020-12-10

## 2020-12-11 ENCOUNTER — LAB (OUTPATIENT)
Dept: LAB | Facility: MEDICAL CENTER | Age: 40
End: 2020-12-11
Payer: COMMERCIAL

## 2020-12-11 DIAGNOSIS — G35 MULTIPLE SCLEROSIS (HCC): ICD-10-CM

## 2020-12-11 LAB
ALBUMIN SERPL BCP-MCNC: 3.6 G/DL (ref 3.5–5)
ALP SERPL-CCNC: 96 U/L (ref 46–116)
ALT SERPL W P-5'-P-CCNC: 29 U/L (ref 12–78)
AST SERPL W P-5'-P-CCNC: 16 U/L (ref 5–45)
BASOPHILS # BLD AUTO: 0.04 THOUSANDS/ΜL (ref 0–0.1)
BASOPHILS NFR BLD AUTO: 0 % (ref 0–1)
BILIRUB DIRECT SERPL-MCNC: 0.16 MG/DL (ref 0–0.2)
BILIRUB SERPL-MCNC: 0.42 MG/DL (ref 0.2–1)
EOSINOPHIL # BLD AUTO: 0.2 THOUSAND/ΜL (ref 0–0.61)
EOSINOPHIL NFR BLD AUTO: 2 % (ref 0–6)
ERYTHROCYTE [DISTWIDTH] IN BLOOD BY AUTOMATED COUNT: 13.6 % (ref 11.6–15.1)
HCT VFR BLD AUTO: 44.6 % (ref 34.8–46.1)
HGB BLD-MCNC: 14 G/DL (ref 11.5–15.4)
IMM GRANULOCYTES # BLD AUTO: 0.02 THOUSAND/UL (ref 0–0.2)
IMM GRANULOCYTES NFR BLD AUTO: 0 % (ref 0–2)
LYMPHOCYTES # BLD AUTO: 3.15 THOUSANDS/ΜL (ref 0.6–4.47)
LYMPHOCYTES NFR BLD AUTO: 35 % (ref 14–44)
MCH RBC QN AUTO: 27.8 PG (ref 26.8–34.3)
MCHC RBC AUTO-ENTMCNC: 31.4 G/DL (ref 31.4–37.4)
MCV RBC AUTO: 89 FL (ref 82–98)
MONOCYTES # BLD AUTO: 0.47 THOUSAND/ΜL (ref 0.17–1.22)
MONOCYTES NFR BLD AUTO: 5 % (ref 4–12)
NEUTROPHILS # BLD AUTO: 5.1 THOUSANDS/ΜL (ref 1.85–7.62)
NEUTS SEG NFR BLD AUTO: 58 % (ref 43–75)
NRBC BLD AUTO-RTO: 0 /100 WBCS
PLATELET # BLD AUTO: 276 THOUSANDS/UL (ref 149–390)
PMV BLD AUTO: 11.4 FL (ref 8.9–12.7)
PROT SERPL-MCNC: 7.4 G/DL (ref 6.4–8.2)
RBC # BLD AUTO: 5.04 MILLION/UL (ref 3.81–5.12)
WBC # BLD AUTO: 8.98 THOUSAND/UL (ref 4.31–10.16)

## 2020-12-11 PROCEDURE — 85025 COMPLETE CBC W/AUTO DIFF WBC: CPT

## 2020-12-11 PROCEDURE — 36415 COLL VENOUS BLD VENIPUNCTURE: CPT

## 2020-12-11 PROCEDURE — 80076 HEPATIC FUNCTION PANEL: CPT

## 2020-12-18 ENCOUNTER — TELEMEDICINE (OUTPATIENT)
Dept: NEUROLOGY | Facility: CLINIC | Age: 40
End: 2020-12-18
Payer: COMMERCIAL

## 2020-12-18 ENCOUNTER — TELEPHONE (OUTPATIENT)
Dept: NEUROLOGY | Facility: CLINIC | Age: 40
End: 2020-12-18

## 2020-12-18 DIAGNOSIS — G35 MULTIPLE SCLEROSIS (HCC): Primary | ICD-10-CM

## 2020-12-18 PROCEDURE — 99213 OFFICE O/P EST LOW 20 MIN: CPT | Performed by: PSYCHIATRY & NEUROLOGY

## 2021-02-25 DIAGNOSIS — G35 MULTIPLE SCLEROSIS (HCC): ICD-10-CM

## 2021-02-25 RX ORDER — GLATIRAMER ACETATE 40 MG/ML
INJECTION, SOLUTION SUBCUTANEOUS
Qty: 12 ML | Refills: 5 | Status: SHIPPED | OUTPATIENT
Start: 2021-02-25 | End: 2021-08-31

## 2021-02-25 NOTE — TELEPHONE ENCOUNTER
Received fax from [facility] stating they have been unable to reach the patient regarding their Copaxone refill  Pt is to call [phone] to refill the medication if they haven't already  Pt made aware, states she scheduled delivery of her medication  Notes this is her last refill on current script  Asking for new script to be sent  Please review and sign, thanks!

## 2021-03-10 DIAGNOSIS — Z23 ENCOUNTER FOR IMMUNIZATION: ICD-10-CM

## 2021-03-14 ENCOUNTER — IMMUNIZATIONS (OUTPATIENT)
Dept: FAMILY MEDICINE CLINIC | Facility: HOSPITAL | Age: 41
End: 2021-03-14

## 2021-03-14 DIAGNOSIS — Z23 ENCOUNTER FOR IMMUNIZATION: Primary | ICD-10-CM

## 2021-03-14 PROCEDURE — 0001A SARS-COV-2 / COVID-19 MRNA VACCINE (PFIZER-BIONTECH) 30 MCG: CPT

## 2021-03-14 PROCEDURE — 91300 SARS-COV-2 / COVID-19 MRNA VACCINE (PFIZER-BIONTECH) 30 MCG: CPT

## 2021-04-08 ENCOUNTER — IMMUNIZATIONS (OUTPATIENT)
Dept: FAMILY MEDICINE CLINIC | Facility: HOSPITAL | Age: 41
End: 2021-04-08

## 2021-04-08 DIAGNOSIS — Z23 ENCOUNTER FOR IMMUNIZATION: Primary | ICD-10-CM

## 2021-04-08 PROCEDURE — 91300 SARS-COV-2 / COVID-19 MRNA VACCINE (PFIZER-BIONTECH) 30 MCG: CPT

## 2021-04-08 PROCEDURE — 0002A SARS-COV-2 / COVID-19 MRNA VACCINE (PFIZER-BIONTECH) 30 MCG: CPT

## 2021-05-05 ENCOUNTER — APPOINTMENT (OUTPATIENT)
Dept: RADIOLOGY | Facility: MEDICAL CENTER | Age: 41
End: 2021-05-05
Payer: COMMERCIAL

## 2021-05-05 ENCOUNTER — OFFICE VISIT (OUTPATIENT)
Dept: OBGYN CLINIC | Facility: MEDICAL CENTER | Age: 41
End: 2021-05-05
Payer: COMMERCIAL

## 2021-05-05 VITALS — BODY MASS INDEX: 50.02 KG/M2 | WEIGHT: 293 LBS | HEIGHT: 64 IN

## 2021-05-05 DIAGNOSIS — M22.2X1 PATELLOFEMORAL PAIN SYNDROME OF RIGHT KNEE: ICD-10-CM

## 2021-05-05 DIAGNOSIS — G89.29 CHRONIC PAIN OF RIGHT KNEE: ICD-10-CM

## 2021-05-05 DIAGNOSIS — M25.561 CHRONIC PAIN OF RIGHT KNEE: Primary | ICD-10-CM

## 2021-05-05 DIAGNOSIS — M25.561 CHRONIC PAIN OF RIGHT KNEE: ICD-10-CM

## 2021-05-05 DIAGNOSIS — G89.29 CHRONIC PAIN OF RIGHT KNEE: Primary | ICD-10-CM

## 2021-05-05 DIAGNOSIS — S83.001A PATELLAR SUBLUXATION, RIGHT, INITIAL ENCOUNTER: ICD-10-CM

## 2021-05-05 PROCEDURE — 73564 X-RAY EXAM KNEE 4 OR MORE: CPT

## 2021-05-05 PROCEDURE — 3008F BODY MASS INDEX DOCD: CPT | Performed by: PSYCHIATRY & NEUROLOGY

## 2021-05-05 PROCEDURE — 99213 OFFICE O/P EST LOW 20 MIN: CPT | Performed by: STUDENT IN AN ORGANIZED HEALTH CARE EDUCATION/TRAINING PROGRAM

## 2021-05-05 NOTE — PROGRESS NOTES
1  Chronic pain of right knee  XR knee 4+ vw right injury    Ambulatory referral to Physical Therapy   2  Patellar subluxation, right, initial encounter  Ambulatory referral to Physical Therapy   3  Patellofemoral pain syndrome of right knee  Ambulatory referral to Physical Therapy     Orders Placed This Encounter   Procedures    XR knee 4+ vw right injury    Ambulatory referral to Physical Therapy        Imaging Studies (I personally reviewed images in PACS and report): 1  X-ray right knee 05/05/2021: Mild degenerative changes as evidenced by joint space narrowing of medial tibiofemoral joint line  Otherwise, no acute osseous abnormalities  Will follow up official radiology interpretation  IMPRESSION:  Chronic right knee pain  H/o knee injury 3+ years ago - has recurrences of "knee popping" when pivoting right leg  Patellofemoral pain syndrome of right knee  Primary OA of right knee  DDx: Suspected right recurrent patellar subluxations, medial meniscal injury    Contributing factors: BMI 65    Other factors: Multiple sclerosis    PLAN:  Repeat X-ray next visit:  None  Clinical exam and radiographic imaging reviewed with patient today, with impression as per above  Right knee radiographs ordered today, with impression as per above; will follow-up official radiology interpretation  I counseled about patellofemoral pain syndrome and suspected patellar subluxation  Given the girth of her leg, I do not feel we would have a patella stabilizing brace that could accommodate her knee  I referred her to formal physical therapy and recommended a minimum of 6 weeks of 1-2x per week (I also provided home exercises)  Patient declined need for pain medication or cortisone injections as she only has pain episodically for a couple days  Separately, I counseled that her BMI is a contributing aspect of increased joint pressure and can hasten the development of knee OA   Patient reports being aware and working on steps to lose weight with a separate provider  Return in about 6 weeks (around 6/16/2021)  CHIEF COMPLAINT:  Right knee pain    HPI:  Shannan Estrada is a 45 y o  female  who presents for       Visit 05/05/2021 :  Initial evaluation of right knee pain: Ongoign issue for 3 years - all stemming from a similar injury:  - Injury in 2018 - patient admits to drinking alcohol and missed getting onto her bed and fell on knee where she had a "popping sensation "  Subsequently she had limping and sensation of knee giving out  Unsure of swelling  Gradually improved over a couple weeks  - Injury in 2019 - at a party and dancing - recurrence of knee popping with a few days of limping and sensation of giving out  - Recently, she has intermittent episodes every couple months of recurrencent knee popping sensation when turning/pivoting on her RLE - causes exacerabation of anterior sharp/aching knee pain that lasts a couple days before progressively resolving  However, patient concerned as she has sensation of her "knee popping out" again  In regards to pain, she has used bengay but has not required pain medications as pain not severe  She reports chronic bilateral knee clicking/popping, even before her injury  She has a OTC knee brace but it corina not provide much stability given her leg girth  She has never seen formal PT for this issue before  Patient unsure of swelling due to leg girth  Denies knee redness, tingling/numbness, knee locking in extension  Denies pain at night that wakes her  Review of Systems   Constitutional: Negative for chills, fatigue and fever  Respiratory: Negative for cough and shortness of breath  Gastrointestinal: Negative for abdominal pain, nausea and vomiting  Musculoskeletal:        As per HPI   Skin: Negative for rash and wound     Neurological:        As per HPI         Medical, Surgical, Family, and Social History    Past Medical History:   Diagnosis Date    Anxiety     Asthma     Hypertension     off meds-stable    Murmur, heart     Seasonal allergies     Sinus problem     Tonsillitis      Past Surgical History:   Procedure Laterality Date    CHOLECYSTECTOMY      DILATION AND CURETTAGE OF UTERUS  05/14/2020    TN INCIS TENDON SHEATH,RADIAL STYLOID Right 10/17/2019    Procedure: RELEASE  RIGHT Daphne Hernandez;  Surgeon: Bianka Figueroa MD;  Location:  MAIN OR;  Service: Orthopedics    WISDOM TOOTH EXTRACTION       Social History   Social History     Substance and Sexual Activity   Alcohol Use Yes    Frequency: Monthly or less    Drinks per session: 1 or 2    Binge frequency: Never     Social History     Substance and Sexual Activity   Drug Use Yes    Types: Marijuana    Comment: medical marijuana     Social History     Tobacco Use   Smoking Status Never Smoker   Smokeless Tobacco Never Used     Family History   Problem Relation Age of Onset    Asthma Mother     COPD Mother     Eczema Mother     Emphysema Mother     Heart disease Father     Hypertension Father     Asthma Father     Alcohol abuse Father     Heart disease Paternal Aunt     Heart disease Paternal Uncle     Cancer Maternal Grandfather     Glaucoma Maternal Grandfather     Asthma Maternal Grandfather     Emphysema Maternal Grandfather     Cancer Paternal Grandfather     Hypertension Paternal Grandfather     Hypertension Maternal Grandmother     Emphysema Maternal Grandmother     Asthma Maternal Grandmother      No Known Allergies       Physical Exam  Ht 5' 4" (1 626 m)   Wt (!) 162 kg (358 lb)   BMI 61 45 kg/m²     Constitutional:  see vital signs  Gen: obese (BMI 99 01) normocephalic/atraumatic, well-groomed  Eyes: No inflammation or discharge of conjunctiva or lids; sclera clear   Pharynx: no inflammation, lesion, or mass of lips  Neck: supple, no masses, non-distended  MSK: no inflammation, lesion, mass, or clubbing of nails and digits except for other than mentioned below  SKIN: no visible rashes or skin lesions  Pulmonary/Chest: Effort normal  No respiratory distress     NEURO: cranial nerves grossly intact  PSYCH:  Alert and oriented to person, place, and time; recent and remote memory intact; mood normal, no depression, anxiety, or agitation, judgment and insight good and intact     Ortho Exam  Right Knee Exam: (exam maneuvers limited d/t girth of right leg/knee)  Erythema: no  Swelling: no  Increased Warmth: no  Tenderness: none  ROM: 0-130  Patella compression with quadriceps contraction: +aggravation of pain  Patellar Apprehension: denies   Patellar Grind: +  Lachman's: unable to perform  Anterior Drawer: negative  Varus laxity: negative  Valgus laxity: negative  Derek: +medial meniscal pain without clicking

## 2021-05-28 ENCOUNTER — TELEPHONE (OUTPATIENT)
Dept: NEUROLOGY | Facility: CLINIC | Age: 41
End: 2021-05-28

## 2021-05-28 NOTE — TELEPHONE ENCOUNTER
Left message confirming that appointment with Dr Billy Mayers on 6/14/21 at 8 am   Asking for patient to return the call the confirm that they will be keeping the appointment  Please discuss and document

## 2021-06-10 ENCOUNTER — APPOINTMENT (OUTPATIENT)
Dept: LAB | Facility: MEDICAL CENTER | Age: 41
End: 2021-06-10
Payer: COMMERCIAL

## 2021-06-10 DIAGNOSIS — G35 MULTIPLE SCLEROSIS (HCC): ICD-10-CM

## 2021-06-10 LAB
ALBUMIN SERPL BCP-MCNC: 3.8 G/DL (ref 3.5–5)
ALP SERPL-CCNC: 86 U/L (ref 46–116)
ALT SERPL W P-5'-P-CCNC: 28 U/L (ref 12–78)
AST SERPL W P-5'-P-CCNC: 14 U/L (ref 5–45)
BASOPHILS # BLD AUTO: 0.02 THOUSANDS/ΜL (ref 0–0.1)
BASOPHILS NFR BLD AUTO: 0 % (ref 0–1)
BILIRUB DIRECT SERPL-MCNC: 0.1 MG/DL (ref 0–0.2)
BILIRUB SERPL-MCNC: 0.33 MG/DL (ref 0.2–1)
EOSINOPHIL # BLD AUTO: 0.15 THOUSAND/ΜL (ref 0–0.61)
EOSINOPHIL NFR BLD AUTO: 2 % (ref 0–6)
ERYTHROCYTE [DISTWIDTH] IN BLOOD BY AUTOMATED COUNT: 13.3 % (ref 11.6–15.1)
HCT VFR BLD AUTO: 43.9 % (ref 34.8–46.1)
HGB BLD-MCNC: 14 G/DL (ref 11.5–15.4)
IMM GRANULOCYTES # BLD AUTO: 0.02 THOUSAND/UL (ref 0–0.2)
IMM GRANULOCYTES NFR BLD AUTO: 0 % (ref 0–2)
LYMPHOCYTES # BLD AUTO: 2.37 THOUSANDS/ΜL (ref 0.6–4.47)
LYMPHOCYTES NFR BLD AUTO: 30 % (ref 14–44)
MCH RBC QN AUTO: 28.6 PG (ref 26.8–34.3)
MCHC RBC AUTO-ENTMCNC: 31.9 G/DL (ref 31.4–37.4)
MCV RBC AUTO: 90 FL (ref 82–98)
MONOCYTES # BLD AUTO: 0.53 THOUSAND/ΜL (ref 0.17–1.22)
MONOCYTES NFR BLD AUTO: 7 % (ref 4–12)
NEUTROPHILS # BLD AUTO: 4.81 THOUSANDS/ΜL (ref 1.85–7.62)
NEUTS SEG NFR BLD AUTO: 61 % (ref 43–75)
NRBC BLD AUTO-RTO: 0 /100 WBCS
PLATELET # BLD AUTO: 281 THOUSANDS/UL (ref 149–390)
PMV BLD AUTO: 11.8 FL (ref 8.9–12.7)
PROT SERPL-MCNC: 7.4 G/DL (ref 6.4–8.2)
RBC # BLD AUTO: 4.89 MILLION/UL (ref 3.81–5.12)
WBC # BLD AUTO: 7.9 THOUSAND/UL (ref 4.31–10.16)

## 2021-06-10 PROCEDURE — 85025 COMPLETE CBC W/AUTO DIFF WBC: CPT

## 2021-06-10 PROCEDURE — 36415 COLL VENOUS BLD VENIPUNCTURE: CPT

## 2021-06-10 PROCEDURE — 80076 HEPATIC FUNCTION PANEL: CPT

## 2021-06-14 ENCOUNTER — OFFICE VISIT (OUTPATIENT)
Dept: NEUROLOGY | Facility: CLINIC | Age: 41
End: 2021-06-14
Payer: COMMERCIAL

## 2021-06-14 VITALS
BODY MASS INDEX: 60.76 KG/M2 | HEART RATE: 80 BPM | DIASTOLIC BLOOD PRESSURE: 80 MMHG | SYSTOLIC BLOOD PRESSURE: 124 MMHG | WEIGHT: 293 LBS

## 2021-06-14 DIAGNOSIS — G35 MULTIPLE SCLEROSIS (HCC): Primary | ICD-10-CM

## 2021-06-14 PROCEDURE — 99214 OFFICE O/P EST MOD 30 MIN: CPT | Performed by: PSYCHIATRY & NEUROLOGY

## 2021-06-14 NOTE — PROGRESS NOTES
Patient ID: Leyda Mac is a 45 y o  female  Assessment/Plan:    Multiple sclerosis ([address] )  Pt here for neuro follow up  Overall pt doing well  No new sxs   No new complaints  No recent hospitalizations  No recent infections  Exam stable  Cont with brand copaxone tiw  Labs from June nl cbc diff and lfts  Pt to call for any new sxs  Repeat labs in October  Follow up 4-6 months       Diagnoses and all orders for this visit:    Multiple sclerosis ([facility] [address] )  -     CBC and differential; Future  -     Hepatic function panel; Future           Subjective:    HPI    Leyda Mac is a 45 y o  female here for neuro follow up   Pt last seen on 12/18/20 with me   Per my last note "Patient initially presented with right visual field disturbance in early November 2017   At time of presentation, patient noted needing to turn her head to see full picture on the right side to see her colleague who was sitting on the right side of her   MRI brain 11/24/17 with multiple supra and infratentorial lesions with varying degrees of enhancement and others nonenhancing   Lesions noted in the left cerebellar hemisphere measuring 9 mm with rim enhancement   Multiple supratentorial lesions and periventricular in location   Some of the periventricular lesions have a perpendicular orientation to the body of the lateral ventricle   The largest is in the right temporal parietal area all 11 mm with partial enhancement of the lesion  Chrystalandria Hurd is a 5 mm enhancing lesion within the occipital horn of the lateral ventricle   MRI cervical spine and MRI thoracic spine were completed in December 2017   No cord lesions found on either study   Patient had a lumbar puncture done as well   Negative Gram stain, negative Lyme PC R  MS panel with positive oligoclonal bands of 4 (4 oligoclonal bands in the CSF only, in addition to 4 paired bands in both CSF and serum), elevated myelin basic protein 2 5 and elevated IgG synthesis rate of 5 3   Negative CSF NMO   Negative CSF ACE   Viral comprehensive PCR negative  Due to positive LP as well as multiple enhancing and nonenhancing lesions seen on MRI brain, diagnosis of MS was made  Alexandria Santacruz was JCV negative with index 0 09  Patient chose to start Copaxone as her 1st IMD therapy   "     Kept above paragraph due to complexities of pt case  pt overall doing well  Pt notes no new sxs  Pt denies any new sxs  No falls or trips  No change in bowel or bladder  No LOC, no seizure  No change in speech or swallowing  No change in vision  No loss of vision  No diplopia  No loss of vision  No headache, no nausea or vomiting  No recent hospitalizations  No recent infections  No fever or chills  No cough  Pt had updated labs in Ren[place] 10 2021 with wbc 7 9, abs lymphs 2 37, abs neutrophils 4 81, lfts normal   Pt with negative covid testing in feb due to work exposure  Pt denies any new sxs  No falls or trips  No change in bowel or bladder  No LOC, no seizure  No change in speech or swallowing  No change in vision  No loss of vision  No diplopia  No loss of vision  No headache, no nausea or vomiting  No recent hospitalizations  No recent infections  Pt is planning on eye brow tattooing and needs clearance  No clear contraindication from medical standpoint  Pt remains on copaxone brand and matt med well at tiw dosing  Pt denies any issue with summer heat  No uthoff phenomenon  The following portions of the patient's history were reviewed and updated as appropriate: allergies, current medications, past family history, past medical history, past social history, past surgical history and problem list and med rec and ros rev  Objective:    Blood pressure 124/80, pulse 80, weight (!) 161 kg (354 lb)  Physical Exam  Constitutional:       General: She is not in acute distress  Appearance: She is not ill-appearing     Eyes:      General: Lids are normal       Extraocular Movements: Extraocular movements intact  Pupils: Pupils are equal, round, and reactive to light  Musculoskeletal:      Right lower leg: No edema  Left lower leg: No edema  Neurological:      Mental Status: She is alert  Coordination: Coordination is intact  Deep Tendon Reflexes: Strength normal and reflexes are normal and symmetric  Psychiatric:         Speech: Speech normal          Neurological Exam  Mental Status  Alert  Recent and remote memory are intact  Speech is normal  Language is fluent with no aphasia  Attention and concentration are normal  Fund of knowledge is appropriate for level of education  Cranial Nerves  CN II: Visual acuity is normal  Visual fields full to confrontation  Right funduscopic exam: disc intact  Left funduscopic exam: disc intact  CN III, IV, VI: Extraocular movements intact bilaterally  Normal lids and orbits bilaterally  Pupils equal round and reactive to light bilaterally  CN V: Facial sensation is normal   CN VII: Full and symmetric facial movement  CN VIII: Hearing is normal   CN IX, X: Palate elevates symmetrically  Normal gag reflex  CN XI: Shoulder shrug strength is normal   CN XII: Tongue midline without atrophy or fasciculations  Motor  Normal muscle bulk throughout  Normal muscle tone  No abnormal involuntary movements  Strength is 5/5 throughout all four extremities  Sensory  Sensation is intact to light touch, pinprick, vibration and proprioception in all four extremities  Reflexes  Deep tendon reflexes are 2+ and symmetric in all four extremities with downgoing toes bilaterally  Coordination  Finger-to-nose, rapid alternating movements and heel-to-shin normal bilaterally without dysmetria  Gait  Normal casual, toe, heel and tandem gait  ROS:    Review of Systems   Constitutional: Negative  Negative for appetite change and fever  HENT: Negative  Negative for hearing loss, tinnitus, trouble swallowing and voice change      Eyes: Negative  Negative for photophobia and pain  Respiratory: Negative  Negative for shortness of breath  Cardiovascular: Negative  Negative for palpitations  Gastrointestinal: Negative  Negative for nausea and vomiting  Endocrine: Negative  Negative for cold intolerance  Genitourinary: Negative  Negative for dysuria, frequency and urgency  Musculoskeletal: Negative  Negative for myalgias and neck pain  Skin: Negative  Negative for rash  Neurological: Negative  Negative for dizziness, tremors, seizures, syncope, facial asymmetry, speech difficulty, weakness, light-headedness, numbness and headaches  Hematological: Negative  Does not bruise/bleed easily  Psychiatric/Behavioral: Negative  Negative for confusion, hallucinations and sleep disturbance

## 2021-06-14 NOTE — ASSESSMENT & PLAN NOTE
Pt here for neuro follow up  Overall pt doing well  No new sxs   No new complaints  No recent hospitalizations  No recent infections  Exam stable  Cont with brand copaxone tiw  Labs from June nl cbc diff and lfts  Pt to call for any new sxs  Repeat labs in October  Follow up 4-6 months

## 2021-06-29 ENCOUNTER — OFFICE VISIT (OUTPATIENT)
Dept: BARIATRICS | Facility: CLINIC | Age: 41
End: 2021-06-29
Payer: COMMERCIAL

## 2021-06-29 VITALS
BODY MASS INDEX: 48.82 KG/M2 | HEIGHT: 65 IN | DIASTOLIC BLOOD PRESSURE: 66 MMHG | RESPIRATION RATE: 16 BRPM | SYSTOLIC BLOOD PRESSURE: 128 MMHG | HEART RATE: 100 BPM | TEMPERATURE: 99.4 F | WEIGHT: 293 LBS

## 2021-06-29 DIAGNOSIS — R63.5 ABNORMAL WEIGHT GAIN: Primary | ICD-10-CM

## 2021-06-29 DIAGNOSIS — G35 MULTIPLE SCLEROSIS (HCC): ICD-10-CM

## 2021-06-29 PROCEDURE — 99203 OFFICE O/P NEW LOW 30 MIN: CPT | Performed by: PHYSICIAN ASSISTANT

## 2021-06-29 PROCEDURE — 1036F TOBACCO NON-USER: CPT | Performed by: PHYSICIAN ASSISTANT

## 2021-06-29 NOTE — PROGRESS NOTES
Assessment/Plan:  Obesity/BMI58 43  -Discussed options of [facility]-Intensive Lifestyle Intervention Program, Very Low Calorie Diet-VLCD, Conservative Program, Sonal-En-Y Gastric Bypass and Vertical Sleeve Gastrectomy and the role of weight loss medications   -Initial weight loss goal of 5-10% weight loss for improved health  -Screening labs: LFTs reviewed 6/10/21  -questions answered: anticipated weight loss w/ surgery, recovery, hospital stay  -Pt would like to review options and call when she decides  No problem-specific Assessment & Plan notes found for this encounter  Goals:  Food log (ie ) www myfitnesspal com,sparkpeople  com,loseit com,calorieking  com,etc  baritastic  No sugary beverages  At least 64oz of water daily  Increase physical activity by 10 minutes daily  Gradually increase physical activity to a goal of 5 days per week for 30 minutes of MODERATE intensity PLUS 2 days per week of FULL BODY resistance training  5-10 servings of fruits and vegetables per day and 25-35 grams of dietary fiber per day, gradually increasing  Recommend checking lab coverage before having labs drawn  2725-8032 calories    Diagnoses and all orders for this visit:    Abnormal weight gain  -     Insulin, fasting; Future  -     TSH, 3rd generation with Free T4 reflex; Future  -     Lipid panel; Future  -     Basic metabolic panel; Future    Multiple sclerosis (HCC)  -     Insulin, fasting; Future  -     TSH, 3rd generation with Free T4 reflex; Future  -     Lipid panel; Future  -     Basic metabolic panel; Future    Body mass index 50 0-59 9, adult (HCC)        Subjective:   Chief Complaint   Patient presents with    Consult     MWM consult     Patient ID: Darnell Francois  is a 37 y o  female with excess weight/obesity here to pursue weight management    Past Medical History:   Diagnosis Date    Anxiety     Asthma     Hypertension     off meds-stable    Murmur, heart     Seasonal allergies     Sinus problem     Tonsillitis      HPI:  Obesity/Excess Weight:  Severity: Very Severe  Onset:  Since age 16, worsened in 25s    Modifiers: Diet and Exercise   [employer]  Has trouble sticking with programs  Contributing factors: Poor Food Choices   MS diagnosis  Associated symptoms: increased joint pain and decreased exercise capacity, body image    Goals: 200  Hydration: 2 bottles of water per day, seltzer;  soda when dining out, 1 cup of  Coffee with creamer  Alcohol: less than monthly  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    The following portions of the patient's history were reviewed and updated as appropriate: allergies, current medications, past family history, past medical history, past social history, past surgical history and problem list     Review of Systems   Cardiovascular: Positive for palpitations (around her menses, but not recently)  Negative for chest pain  Psychiatric/Behavioral: Negative  Objective:    /66 (BP Location: Left arm, Patient Position: Sitting, Cuff Size: Adult)   Pulse 100   Temp 99 4 °F (37 4 °C) (Tympanic)   Resp 16   Ht 5' 5" (1 651 m)   Wt (!) 159 kg (351 lb 1 6 oz)   BMI 58 43 kg/m²     Physical Exam  Vitals and nursing note reviewed  Constitutional   General appearance: Abnormal   well developed and morbidly obese  Pulmonary   Respiratory effort: No increased work of breathing or signs of respiratory distress  Abdomen   Abdomen: Abnormal   The abdomen was obese    Musculoskeletal   Gait and station: Normal     Psychiatric   Orientation to person, place and time: Normal     Affect: appropriate

## 2021-06-29 NOTE — PATIENT INSTRUCTIONS
Goals: Food log (ie ) www myfitnesspal com,sparkpeople  com,loseit com,calorieking  com,etc  baritastic  No sugary beverages  At least 64oz of water daily  Increase physical activity by 10 minutes daily   Gradually increase physical activity to a goal of 5 days per week for 30 minutes of MODERATE intensity PLUS 2 days per week of FULL BODY resistance training  5-10 servings of fruits and vegetables per day and 25-35 grams of dietary fiber per day, gradually increasing  Recommend checking lab coverage before having labs drawn  6486-1836 calories

## 2021-08-31 DIAGNOSIS — G35 MULTIPLE SCLEROSIS (HCC): ICD-10-CM

## 2021-08-31 RX ORDER — GLATIRAMER ACETATE 40 MG/ML
INJECTION, SOLUTION SUBCUTANEOUS
Qty: 12 ML | Refills: 5 | Status: SHIPPED | OUTPATIENT
Start: 2021-08-31 | End: 2022-02-07

## 2021-12-31 ENCOUNTER — AMB VIDEO VISIT (OUTPATIENT)
Dept: OTHER | Facility: HOSPITAL | Age: 41
End: 2021-12-31

## 2021-12-31 DIAGNOSIS — J01.10 ACUTE NON-RECURRENT FRONTAL SINUSITIS: Primary | ICD-10-CM

## 2021-12-31 PROCEDURE — ECARE PR SL URGENT CARE VIRTUAL VISIT: Performed by: PHYSICIAN ASSISTANT

## 2021-12-31 RX ORDER — AMOXICILLIN AND CLAVULANATE POTASSIUM 875; 125 MG/1; MG/1
1 TABLET, FILM COATED ORAL 2 TIMES DAILY
Qty: 20 TABLET | Refills: 0 | Status: SHIPPED | OUTPATIENT
Start: 2021-12-31 | End: 2022-01-10

## 2022-01-03 NOTE — TELEPHONE ENCOUNTER
**FYI**    Start form has been mailed to pt  With a sign here notation also an addressed envelope  Will await signed copy   Once received will fax and send to central scanning [facility]

## 2022-01-11 ENCOUNTER — TELEPHONE (OUTPATIENT)
Dept: NEUROLOGY | Facility: CLINIC | Age: 42
End: 2022-01-11

## 2022-01-11 NOTE — TELEPHONE ENCOUNTER
Called patient to reschedule 1/17 appt as Dr Hilda Lieberman is out of the office   Patient was agreeable to switching to a VV and with Della on 1/17, Patient has My Chart

## 2022-01-24 ENCOUNTER — TELEPHONE (OUTPATIENT)
Dept: NEUROLOGY | Facility: CLINIC | Age: 42
End: 2022-01-24

## 2022-01-24 NOTE — TELEPHONE ENCOUNTER
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

## 2022-01-24 NOTE — TELEPHONE ENCOUNTER
Please check with pt what is the "permanent" makeup- tattoo, microblading? Pt needs to check if any of the materials used in procedure prohibts her from getting MRIs in future?  ie any metal or metal products? Also, need to address risk for infection  Any infections can stimulate immune system and cause flare  Is there a clearance form? ? Not sure what we are clearing her for?

## 2022-01-26 NOTE — TELEPHONE ENCOUNTER
Called pt and reviewed below message  Pt will  completing procedure and confirm if any metal or metal products are in the pigment they use  She will ask what is needed in the note specifically- there is no clearance form  Pt's appt is Saturday

## 2022-01-28 NOTE — TELEPHONE ENCOUNTER
Spoke w/patient  Advised her we will provide letter  Patient requested we send letter via [address]  Letter generated sent and sent via [address]

## 2022-01-28 NOTE — TELEPHONE ENCOUNTER
Prudence Overton  to Jaren Call MD        7:10 PM  Good Evening,      I am reaching out to follow up from the call yesterday regarding my permanent makeup procedure this coming Saturday  I reached out to my cosmetic artist and she was completely prepared for this type of situation  She confirmed she has organic pigments that do not have any metals in them which are different than the "normal" pigment  I feel very confident (she confirmed her best friend and brow tattoo client for years, has an auto immune disease that also requires MRI's and she uses the organic with her and there are no issues)  The letter will  just need to state that I am ok to move fwd w the procedure  I also wanted to thank you for being so thorough because this would have NEVER crossed my mind, I appreciate that more than you know  Please call me if needed at [phone]

## 2022-02-07 DIAGNOSIS — G35 MULTIPLE SCLEROSIS (HCC): ICD-10-CM

## 2022-02-07 RX ORDER — GLATIRAMER ACETATE 40 MG/ML
INJECTION, SOLUTION SUBCUTANEOUS
Qty: 12 ML | Refills: 5 | Status: SHIPPED | OUTPATIENT
Start: 2022-02-07

## 2022-03-07 DIAGNOSIS — N30.90 CYSTITIS: Primary | ICD-10-CM

## 2022-03-07 RX ORDER — CEPHALEXIN 500 MG/1
500 CAPSULE ORAL EVERY 12 HOURS SCHEDULED
Qty: 14 CAPSULE | Refills: 0 | Status: SHIPPED | OUTPATIENT
Start: 2022-03-07 | End: 2022-03-14

## 2022-04-08 ENCOUNTER — TELEPHONE (OUTPATIENT)
Dept: NEUROLOGY | Facility: CLINIC | Age: 42
End: 2022-04-08

## 2022-04-08 NOTE — TELEPHONE ENCOUNTER
Let pt know no contraindication for birth control with MS  Most contraceptive methods appear to be safe for MS pts  The only issue is with pts with prolonged immobility and risk for dvts  This pt is ambulatory and imd med not an issue with contraception

## 2022-04-08 NOTE — TELEPHONE ENCOUNTER
----- Message from Yoko Brock RN sent at 4/8/2022  4:05 PM EDT -----  Regarding: FW: Birth Control & MS    ----- Message -----  From: Clarence Huizar  Sent: 4/8/2022   7:38 AM EDT  To: Neurology [facility] Clinical Team 3  Subject: FW: Birth Control & MS                             ----- Message -----  From: Kendall Butterfield  Sent: 4/7/2022   4:56 PM EDT  To: Neurology [place] [facility]  Subject: Birth Humphrey Rust Dr Festus Casillas,     I am reaching out because I had a discussion with my gyno in Nov regarding birth control options  She suggested an IUD (Mirena) or LoEstrin as the best option for me  I haven't been on any type of birth control since my early 19's so I am researching and am terrified  (I am recently  and want to be fully prepared) Some of my research says pills are fine, but 40 and overweight, it scares me a little  IUD seems great per the gyno, but friends and family in my age group disagree immensely and have had terrible experiences  I have also read that IUD's are best for MS patients  I'm not asking for you to choose, but rather give me some feedback based on MS due to my gyno not knowing much about the disease and/or taking Copaxone  I appreciate any feedback you can offer       Thanks,   Mayte Gary

## 2022-04-12 NOTE — TELEPHONE ENCOUNTER
Called patient  Received vm  Mailbox is not set up  Unable to leave msg  MyChart msg sent  [Follow-Up] : a follow-up evaluation of

## 2022-05-26 ENCOUNTER — TELEPHONE (OUTPATIENT)
Dept: NEUROLOGY | Facility: CLINIC | Age: 42
End: 2022-05-26

## 2022-05-26 DIAGNOSIS — G35 MULTIPLE SCLEROSIS (HCC): Primary | ICD-10-CM

## 2022-05-26 NOTE — TELEPHONE ENCOUNTER
Did attempt PA on CMM for brand Copaxone and confirmed generic glatiramer acetate is preferred  Brand likely not covered  Okay to switch pt to generic glatiramer acetate? Per note below pt is aware of this

## 2022-05-26 NOTE — TELEPHONE ENCOUNTER
PA for glatiramer acetate submitted, marked as urgent  Key: [id number]     Glatiramer acetate start form completed  Will see if Portillo Radha is agreeable to signing to expedite process  Did call pt and explain process of switching to glatiramer acetate  Pt verbalizes understanding

## 2022-05-26 NOTE — TELEPHONE ENCOUNTER
Patient of Dr Baer  to advise she had insurance change and needs new auth for capoxone; said her insurance covers generic    next dose will be due on Tuesday    She said she is part of the [email or web address] program    New Insurance: Clerical Team Please Add to chart    [employer]  ID [id number]  Grp # [id number]  rx bin [id number]  rx pcn cbc  rx grp rx cap  [id number]

## 2022-05-31 RX ORDER — GLATIRAMER 40 MG/ML
INJECTION, SOLUTION SUBCUTANEOUS
Qty: 12 ML | Refills: 11 | Status: SHIPPED | OUTPATIENT
Start: 2022-05-31

## 2022-05-31 NOTE — TELEPHONE ENCOUNTER
PA for glatiramer acetate approved from 5/26/22 - 5/26/23  [employer] [phone]  Spoke w/Alka RN  They spoke w/patient on 5/26/22  Completed [place] services  Script has been triaged to [facility]  Specialty pharmacy is [facility]  [facility] [address] [phone]  Spoke w/Lindy  Advised her of PA approval for glatiramer acetate  [facility] states they did not receive triaged script from GAVIN Martinez  She requested   we send script  [facility] also requested we fax copy of PA aprpoval letter to:    (f) [phone] Attn: PA Dept/ID# [id number]  PA approval letter printed and faxed to above number  Script pended below  Matias Connor - please sign if agreeable  Thanks!

## 2022-11-23 ENCOUNTER — TELEPHONE (OUTPATIENT)
Dept: NEUROLOGY | Facility: CLINIC | Age: 42
End: 2022-11-23

## 2022-11-23 NOTE — TELEPHONE ENCOUNTER
Pt called in to schedule a f/u appt as she has not been seen since 6/2021  No new concerns she would just like to follow up  Can Pt see Dr Haim Lacy or an AP? Please let me know and ill call Pt back to schedule

## 2023-01-18 ENCOUNTER — TELEPHONE (OUTPATIENT)
Dept: NEUROLOGY | Facility: CLINIC | Age: 43
End: 2023-01-18

## 2023-01-31 ENCOUNTER — TELEPHONE (OUTPATIENT)
Dept: NEUROLOGY | Facility: CLINIC | Age: 43
End: 2023-01-31

## 2023-01-31 NOTE — TELEPHONE ENCOUNTER
----- Message from Sheryle Ore, RN sent at 1/31/2023 10:37 AM EST -----  Regarding: FW: Bloodwork? Contact: [phone]  Dr Antwon Vanessa - please advise  Thank you   ----- Message -----  From: Nidia Donnelly  Sent: 1/27/2023  10:03 AM EST  To: Neurology [facility] Clinical Team 3  Subject: Bloodwork? Please read and assist if appropriate       ----- Message -----  From: Tawanna Cifuentes  Sent: 1/27/2023   9:39 AM EST  To: [facility]  Subject: Kajal Pino? Will I need to complete any bloodwork prior to my appoinment next week? If so, can it be sent directly to [facility]  Fax: [phone]

## 2023-02-01 ENCOUNTER — OFFICE VISIT (OUTPATIENT)
Dept: NEUROLOGY | Facility: CLINIC | Age: 43
End: 2023-02-01

## 2023-02-01 VITALS
HEART RATE: 84 BPM | SYSTOLIC BLOOD PRESSURE: 158 MMHG | DIASTOLIC BLOOD PRESSURE: 85 MMHG | RESPIRATION RATE: 18 BRPM | HEIGHT: 64 IN | WEIGHT: 293 LBS | BODY MASS INDEX: 50.02 KG/M2 | TEMPERATURE: 96.7 F

## 2023-02-01 DIAGNOSIS — E55.9 VITAMIN D DEFICIENCY: ICD-10-CM

## 2023-02-01 DIAGNOSIS — G35 MULTIPLE SCLEROSIS (HCC): Primary | ICD-10-CM

## 2023-02-01 PROBLEM — R93.0 ABNORMAL MRI OF HEAD: Status: RESOLVED | Noted: 2017-12-14 | Resolved: 2023-02-01

## 2023-02-01 NOTE — PROGRESS NOTES
Patient ID: Tenny Severin is a 40 y o  female  Assessment/Plan:    Multiple sclerosis ([facility] )  Patient with MS diagnosed in late 2017  She has been on Copaxone since 2018 and tolerating well  She was recently changed to generic glatiramer acetate due to insurance reasons in May 2022, no issues with changing from brand to generic  She has not had any new or progressive neurologic symptoms  Her last imaging was in 2019  I am suggesting she update MRI brain for surveillance  She has no prior cord lesions, therefore will not update her cord imaging at this time  She had labs done this morning, CBC normal and LFTs are still pending  She is taking a vitamin D supplement  We will check a vitamin D level on her next set of labs prior to her next appointment  Her neurologic exam was stable today  She will return in 6 months or sooner if needed  Diagnoses and all orders for this visit:    Multiple sclerosis ([facility] )  -     CBC and differential; Future  -     Comprehensive metabolic panel; Future  -     Vitamin D 25 hydroxy; Future  -     MRI brain MS wo and w contrast; Future    Vitamin D deficiency           Subjective:    HPI    Tenny Severin is a 40 y o  female here for neuro follow up  She was last seen over a year and a half ago in June 2021  To review, patient initially presented with right visual field disturbance in early November 2017  At time of presentation, patient noted needing to turn her head to see full picture on the right side to see her colleague who was sitting on the right side of her  MRI brain 11/24/17 with multiple supra and infratentorial lesions with varying degrees of enhancement and others nonenhancing  Lesions noted in the left cerebellar hemisphere measuring 9 mm with rim enhancement  Multiple supratentorial lesions and periventricular in location  Some of the periventricular lesions have a perpendicular orientation to the body of the lateral ventricle    The largest is in the right temporal parietal area all 11 mm with partial enhancement of the lesion  There is a 5 mm enhancing lesion within the occipital horn of the lateral ventricle  MRI cervical spine and MRI thoracic spine were completed in December 2017  No cord lesions found on either study  Patient had a lumbar puncture done as well  Negative Gram stain, negative Lyme PC R  MS panel with positive oligoclonal bands of 4 (4 oligoclonal bands in the CSF only, in addition to 4 paired bands in both CSF and serum), elevated myelin basic protein 2 5 and elevated IgG synthesis rate of 5 3  Negative CSF NMO  Negative CSF ACE  Viral comprehensive PCR negative  Due to positive LP as well as multiple enhancing and nonenhancing lesions seen on MRI brain, diagnosis of MS was made  She was JCV negative with index 0 09  Patient chose to start Copaxone as her 1st DMT  Copaxone was started in June 2018  In May 2022 we were informed her insurance would no longer cover brand name Copaxone, therefore she was switched to generic glatiramer acetate  Today, patient reports she is doing well  No issues with changing from brand name Copaxone to generic  She denies any new symptoms  No vision changes, vertigo, speech/swallowing difficulty, bowel/bladders changes, weakness, falls  She gets some tingling in her hands with prolonged typing (she works on a computer all day), but this is unchanged, has dx of CTS  She had labs done this AM at [facility], CBC normal, LFTs pending  Her last imaging was in 2019  MRI brain and c-spine 11/14/2019 were both stable  MRI t-spine last completed 12/6/2018 and stable  She has no lesions in either the cervical or thoracic cord      The following portions of the patient's history were reviewed and updated as appropriate: current medications, past family history, past medical history, past social history, past surgical history and problem list          Objective:    Blood pressure 158/85, pulse 84, temperature (!) 96 7 °F (35 9 °C), temperature source Tympanic, resp  rate 18, height 5' 4" (1 626 m), weight (!) 164 kg (362 lb)  Physical Exam  Constitutional:       Appearance: Normal appearance  HENT:      Head: Normocephalic and atraumatic  Eyes:      Extraocular Movements: EOM normal       Pupils: Pupils are equal, round, and reactive to light  Neurological:      Mental Status: She is alert  Motor: Motor strength is normal       Deep Tendon Reflexes: Reflexes are normal and symmetric  Psychiatric:         Mood and Affect: Mood normal          Speech: Speech normal          Behavior: Behavior normal          Neurological Exam  Mental Status  Alert  Oriented to person, place, time and situation  Speech is normal  Language is fluent with no aphasia  Attention and concentration are normal     Cranial Nerves  CN II: Visual fields full to confrontation  CN III, IV, VI: Extraocular movements intact bilaterally  Pupils equal round and reactive to light bilaterally  CN V: Facial sensation is normal   CN VII: Full and symmetric facial movement  CN VIII: Hearing is normal   CN IX, X: Palate elevates symmetrically  CN XI: Shoulder shrug strength is normal   CN XII: Tongue midline without atrophy or fasciculations  Motor   Normal muscle tone  Strength is 5/5 throughout all four extremities  Sensory  Light touch is normal in upper and lower extremities  Reflexes  Deep tendon reflexes are 2+ and symmetric in all four extremities  Difficult to elicit lower extremity reflexes due to body habitus  Coordination  Right: Finger-to-nose normal Left: Finger-to-nose normal     Gait  Casual gait is normal including stance, stride, and arm swing  ROS:    Review of Systems   Constitutional: Negative for chills and fever  HENT: Negative for ear pain and sore throat  Eyes: Negative for pain and visual disturbance  Respiratory: Negative for cough and shortness of breath  Cardiovascular: Negative for chest pain and palpitations  Gastrointestinal: Negative for abdominal pain and vomiting  Genitourinary: Negative for dysuria and hematuria  Musculoskeletal: Negative for arthralgias and back pain  Skin: Negative for color change and rash  Neurological: Negative for seizures and syncope  All other systems reviewed and are negative      I personally reviewed and updated the ROS as appropriate

## 2023-02-01 NOTE — ASSESSMENT & PLAN NOTE
Patient with MS diagnosed in late 2017  She has been on Copaxone since 2018 and tolerating well  She was recently changed to generic glatiramer acetate due to insurance reasons in May 2022, no issues with changing from brand to generic  She has not had any new or progressive neurologic symptoms  Her last imaging was in 2019  I am suggesting she update MRI brain for surveillance  She has no prior cord lesions, therefore will not update her cord imaging at this time  She had labs done this morning, CBC normal and LFTs are still pending  She is taking a vitamin D supplement  We will check a vitamin D level on her next set of labs prior to her next appointment  Her neurologic exam was stable today  She will return in 6 months or sooner if needed

## 2023-02-01 NOTE — PATIENT INSTRUCTIONS
Continue glatiramer acetate  Update MRI brain at this time  Repeat labs again before next visit, and will include vit D level  Continue vit D supplement  Follow up in 6 months or sooner if needed

## 2023-03-24 ENCOUNTER — TELEPHONE (OUTPATIENT)
Dept: NEUROLOGY | Facility: CLINIC | Age: 43
End: 2023-03-24

## 2023-03-24 NOTE — TELEPHONE ENCOUNTER
----- Message from Angie Chang MD sent at 3/24/2023  5:58 AM EDT -----  Let pt know vit d low at 29  Please see if pt is taking vit d replacement  Send copy of labs to pcp  Pt should be on vit d3 at 3,000 Iu daily  If already on this dose, we will need to increase it

## 2023-03-25 ENCOUNTER — HOSPITAL ENCOUNTER (OUTPATIENT)
Dept: RADIOLOGY | Facility: HOSPITAL | Age: 43
Discharge: HOME | End: 2023-03-25

## 2023-03-25 DIAGNOSIS — G35 MULTIPLE SCLEROSIS (HCC): ICD-10-CM

## 2023-03-25 RX ADMIN — GADOBUTROL 15 ML: 604.72 INJECTION INTRAVENOUS at 17:09

## 2023-04-06 DIAGNOSIS — G35 MULTIPLE SCLEROSIS (HCC): ICD-10-CM

## 2023-04-06 RX ORDER — GLATIRAMER 40 MG/ML
INJECTION, SOLUTION SUBCUTANEOUS
Qty: 12 ML | Refills: 11 | Status: SHIPPED | OUTPATIENT
Start: 2023-04-06

## 2023-04-25 ENCOUNTER — RA CDI HCC (OUTPATIENT)
Dept: OTHER | Facility: HOSPITAL | Age: 43
End: 2023-04-25

## 2023-04-25 NOTE — PROGRESS NOTES
[facility]  coding opportunities          Chart Reviewed number of suggestions sent to Provider: 1   [phone]    Patients Insurance        Commercial Insurance: [employer]

## (undated) DEVICE — NEEDLE 25G X 1 1/2

## (undated) DEVICE — ADHESIVE SKIN HIGH VISCOSITY EXOFIN 1ML

## (undated) DEVICE — 3M™ TEGADERM™ TRANSPARENT FILM DRESSING FRAME STYLE, 1626W, 4 IN X 4-3/4 IN (10 CM X 12 CM), 50/CT 4CT/CASE: Brand: 3M™ TEGADERM™

## (undated) DEVICE — STERI DRAPE 1000 NON-STERILE ROLL

## (undated) DEVICE — GLOVE INDICATOR PI UNDERGLOVE SZ 7 BLUE

## (undated) DEVICE — GLOVE INDICATOR PI UNDERGLOVE SZ 7.5 BLUE

## (undated) DEVICE — SPONGE PVP SCRUB WING STERILE

## (undated) DEVICE — CHLORAPREP HI-LITE 26ML ORANGE

## (undated) DEVICE — 3M™ STERI-STRIP™ REINFORCED ADHESIVE SKIN CLOSURES, R1546, 1/4 IN X 4 IN (6 MM X 100 MM), 10 STRIPS/ENVELOPE: Brand: 3M™ STERI-STRIP™

## (undated) DEVICE — GLOVE SRG BIOGEL 7

## (undated) DEVICE — SUT MONOCRYL 4-0 PS-2 27 IN Y426H

## (undated) DEVICE — SUT VICRYL 3-0 SH 27 IN J416H

## (undated) DEVICE — STERILE BETHLEHEM PLASTIC HAND: Brand: CARDINAL HEALTH

## (undated) DEVICE — GLOVE INDICATOR PI UNDERGLOVE SZ 8 BLUE

## (undated) DEVICE — GLOVE SRG BIOGEL 7.5

## (undated) DEVICE — GAUZE SPONGES,16 PLY: Brand: CURITY

## (undated) DEVICE — INTENDED FOR TISSUE SEPARATION, AND OTHER PROCEDURES THAT REQUIRE A SHARP SURGICAL BLADE TO PUNCTURE OR CUT.: Brand: BARD-PARKER SAFETY BLADES SIZE 15, STERILE